# Patient Record
Sex: FEMALE | Race: WHITE | NOT HISPANIC OR LATINO | Employment: PART TIME | URBAN - METROPOLITAN AREA
[De-identification: names, ages, dates, MRNs, and addresses within clinical notes are randomized per-mention and may not be internally consistent; named-entity substitution may affect disease eponyms.]

---

## 2017-01-04 ENCOUNTER — ALLSCRIPTS OFFICE VISIT (OUTPATIENT)
Dept: OTHER | Facility: OTHER | Age: 52
End: 2017-01-04

## 2017-01-04 DIAGNOSIS — M25.569 PAIN IN KNEE: ICD-10-CM

## 2017-01-04 DIAGNOSIS — S83.232D COMPLEX TEAR OF MEDIAL MENISCUS, CURRENT INJURY, LEFT KNEE, SUBSEQUENT ENCOUNTER: ICD-10-CM

## 2017-01-10 ENCOUNTER — ALLSCRIPTS OFFICE VISIT (OUTPATIENT)
Dept: OTHER | Facility: OTHER | Age: 52
End: 2017-01-10

## 2017-01-10 LAB — S PYO AG THROAT QL: POSITIVE

## 2017-04-02 ENCOUNTER — HOSPITAL ENCOUNTER (OUTPATIENT)
Dept: RADIOLOGY | Facility: MEDICAL CENTER | Age: 52
Discharge: HOME | End: 2017-04-02
Attending: PHYSICIAN ASSISTANT | Admitting: FAMILY MEDICINE
Payer: COMMERCIAL

## 2017-04-02 ENCOUNTER — TRANSCRIBE ORDERS (OUTPATIENT)
Dept: URGENT CARE | Facility: MEDICAL CENTER | Age: 52
End: 2017-04-02

## 2017-04-02 ENCOUNTER — OFFICE VISIT (OUTPATIENT)
Dept: URGENT CARE | Facility: MEDICAL CENTER | Age: 52
End: 2017-04-02
Payer: COMMERCIAL

## 2017-04-02 DIAGNOSIS — M79.671 PAIN OF RIGHT FOOT: ICD-10-CM

## 2017-04-02 PROCEDURE — 73630 X-RAY EXAM OF FOOT: CPT

## 2017-04-02 PROCEDURE — 99213 OFFICE O/P EST LOW 20 MIN: CPT

## 2017-05-08 ENCOUNTER — GENERIC CONVERSION - ENCOUNTER (OUTPATIENT)
Dept: OTHER | Facility: OTHER | Age: 52
End: 2017-05-08

## 2017-05-31 ENCOUNTER — ALLSCRIPTS OFFICE VISIT (OUTPATIENT)
Dept: OTHER | Facility: OTHER | Age: 52
End: 2017-05-31

## 2017-05-31 DIAGNOSIS — J01.00 ACUTE MAXILLARY SINUSITIS: ICD-10-CM

## 2017-05-31 DIAGNOSIS — I10 ESSENTIAL (PRIMARY) HYPERTENSION: ICD-10-CM

## 2017-05-31 DIAGNOSIS — E66.01 MORBID (SEVERE) OBESITY DUE TO EXCESS CALORIES (HCC): ICD-10-CM

## 2017-09-05 ENCOUNTER — OFFICE VISIT (OUTPATIENT)
Dept: URGENT CARE | Facility: MEDICAL CENTER | Age: 52
End: 2017-09-05
Payer: COMMERCIAL

## 2017-09-05 ENCOUNTER — APPOINTMENT (OUTPATIENT)
Dept: RADIOLOGY | Facility: MEDICAL CENTER | Age: 52
End: 2017-09-05
Payer: COMMERCIAL

## 2017-09-05 DIAGNOSIS — M25.562 PAIN IN LEFT KNEE: ICD-10-CM

## 2017-09-05 DIAGNOSIS — M25.569 PAIN IN KNEE: ICD-10-CM

## 2017-09-05 PROCEDURE — 99213 OFFICE O/P EST LOW 20 MIN: CPT

## 2017-09-05 PROCEDURE — 73562 X-RAY EXAM OF KNEE 3: CPT

## 2018-01-09 NOTE — PROGRESS NOTES
Assessment    1  Cervical radiculopathy (723 4) (M54 12)   2  Cervical spinal stenosis (723 0) (M48 02)   3  Hydrocephalus (331 4) (G91 9)    Plan    · Follow-up visit in 6 months Evaluation and Treatment  Follow-up  Status: Complete   Done: 16UYP0877   Ordered; For: Cervical radiculopathy, Cervical spinal stenosis, Hydrocephalus; Ordered By: Tricia Butler Performed:  Due: 86SOM5462; Last Updated By: Peyton Evans; 2/4/2016 9:32:11 AM    Discussion/Summary    49-year-old woman with posterior fossa arachnoid cyst, ventriculomegaly with compensated hydrocephalus and cervical spondylosis and stenosis  I reviewed her history, physical examination and imaging in detail with her today  A total of 30 minutes is spent with patient which more than 50% was spent in direct counseling coordination of care  The arachnoid cyst and ventriculomegaly noted on her MRI seems incidental to her presentation  She does not have signs or symptoms of raised intracranial pressure  I did review the signs and symptoms of raised intracranial pressure with her today and asked her to contact my office should any concerns arise  She has no objective findings of myelopathy today  Her right arm pain seems to be radicular nature  She was recently seen by her pain specialist, but felt that her symptoms were not severe enough to merit any changes in her medication or consideration of cervical epidural steroid injection  At present, she is not interested in considering any surgical intervention for cervical stenosis  All her questions were answered to her satisfaction  I reviewed the signs and symptoms of raised intracranial pressure and cervical radiculopathy and myelopathy with her today  She will avoid activities that could result in extreme range of motion of the cervical spine  I will see her again in 6 months time for ongoing clinical surveillance  She will contact my office should concerns arise in the interim   She is in agreement with this course of action  The patient was counseled regarding instructions for management, risk factor reductions, prognosis, patient and family education, impressions, importance of compliance with treatment  total time of encounter was 25 minutes and 20 minutes was spent counseling  Chief Complaint  Right arm pain  History of Present Illness  I last saw this pleasant 49-year-old left-hand-dominant woman in July 2015  She had been referred for cervical stenosis with radiculopathy in addition to an arachnoid cyst with hydrocephalus  Since her last visit she has had no recurrence of her left arm radicular symptoms but currently has 2-8/10 pain radiating from the right paraspinal musculature into her biceps  The pain depends on her activity level and the weather  She feels as if her right hand is somewhat weak  She denies any numbness in the right arm  She has bilateral knee pain but denies any weakness or numbness in her legs  She notices urinary urgency without nikos incontinence  She denies any fecal incontinence  She denies any change in perineal sensation  She denies any headaches, nausea vomiting or change in vision  She denies any swallowing difficulties  She denies any loss of consciousness or seizure activity  She presents today for ongoing clinical follow-up  Review of Systems    Constitutional: No fever, no chills, feels well, no tiredness, no recent weight gain or weight loss  Eyes: wearing eyeglasses, but No complaints of eye pain, no red eyes, no eyesight problems, no discharge, no dry eyes, no itching of eyes  ENT: sore throat, hoarseness and sinus congestion, but no earache, no nosebleeds, no hearing loss and no nasal discharge  Cardiovascular: No complaints of slow heart rate, no fast heart rate, no chest pain, no palpitations, no leg claudication, no lower extremity edema  Respiratory: orthopnea and PND     Gastrointestinal: No complaints of abdominal pain, no constipation, no nausea or vomiting, no diarrhea, no bloody stools  Genitourinary: urgency, but No complaints of dysuria, no incontinence, no pelvic pain, no dysmenorrhea, no vaginal discharge or bleeding  Musculoskeletal: limb pain and right arm pain to elbow cramping in right forearm    The patient presents with complaints of neck no arthralgias, radiating to the bilateral upper arm and bilateral lower arm  Integumentary: No complaints of skin rash or lesions, no itching, no skin wounds, no breast pain or lump  Neurological: limb weakness and forgetfulness / STML, weakness in the right forearm and right hand, but no headache, no numbness, no tingling, no confusion, no convulsions, no fainting and no difficulty walking    The patient presents with complaints of occasional episodes of dizziness  Psychiatric: Not suicidal, no sleep disturbance, no anxiety or depression, no change in personality, no emotional problems  Endocrine: No complaints of proptosis, no hot flashes, no muscle weakness, no deepening of the voice, no feelings of weakness  Hematologic/Lymphatic: 81 mg aspirin regimen - denies any personal or known family history of bleeding/clotting issues, anuerysm, but No complaints of swollen glands, no swollen glands in the neck, does not bleed easily, does not bruise easily  ROS reviewed  Active Problems    1  Acute sinusitis (461 9) (J01 90)   2  Allergic reaction (995 3) (T78 40XA)   3  Anxiety (300 00) (F41 9)   4  Benign essential HTN (401 1) (I10)   5  Cervical arthritis (721 0) (M46 92)   6  Cervical radiculopathy (723 4) (M54 12)   7  Cervical spinal stenosis (723 0) (M48 02)   8  Cervical spondylosis without myelopathy (721 0) (M47 812)   9  Cervicalgia (723 1) (M54 2)   10  Eustachian tube dysfunction (381 81) (H69 80)   11  Hydrocephalus (331 4) (G91 9)   12  Intracranial arachnoid cyst (348 0) (G93 0)   13  Knee pain (719 46) (M25 569)   14  Obesity (278 00) (E66 9)   15   Pain in right foot (729 5) (M79 671)   16  Shoulder tendonitis (726 10) (M75 80)   17  Spider bite (989 5,E905 1) (T63 301A)   18  Subacromial bursitis (726 19) (M75 50)   19  Urticaria (708 9) (L50 9)    Past Medical History    1  History of Acute maxillary sinusitis (461 0) (J01 00)   2  History of Allergic Reaction (995 3)   3  History of Cellulitis, leg (682 6) (L03 119)   4  History of Complete Tear Of The Anterior Cruciate Ligament Of The Knee (844 2)   5  History of Cough (786 2) (R05)   6  History of Elbow tendonitis (727 09) (M77 8)   7  History of Fall from horse (E828 2) (V80 010A)   8  History of acute sinusitis (V12 69) (Z87 09)   9  History of arthritis (V13 4) (Z87 39)   10  History of concussion (V15 52) (Z87 820)   11  History of depression (V11 8) (Z86 59)   12  History of Sore throat (462) (J02 9)   13  History of Tingling (782 0) (R20 2)   14  History of Varicella (052 9) (B01 9)    The active problems and past medical history were reviewed and updated today  Surgical History    1  History of Gallbladder Surgery   2  History of Knee Surgery    The surgical history was reviewed and updated today  Family History    1  Family history of Osteoporosis (V17 81)    2  Family history of Bone Cancer   3  Family history of Colitis   4  Family history of    5  Family history of Hiatal Hernia    6  Family history of     7  Family history of     6  Family history of     5  Family history of     8  Family history of Back problem   11  Family history of Benign Essential Hypertension   12  Family history of Hyperlipidemia   15  Family history of Hypertension, benign    The family history was reviewed and updated today         Social History    · Being A Social Drinker   · Completed college   · Daily Coffee Consumption (___ Cups/Day)   · Housewife or homemaker   ·    · Never A Smoker   · No drug use   · Uses Safety Equipment - Seatbelts  The social history was reviewed and updated today  Current Meds   1  ALPRAZolam 0 25 MG Oral Tablet; take 1 tablet every day; Therapy: 27LKZ4562 to (Gabriel Preston)  Requested for: 63TUK5115; Last   Rx:61Tag6237 Ordered   2  Aspirin 81 MG CAPS; qd;   Therapy: (Recorded:03Lgd1418) to Recorded   3  Gabapentin 300 MG Oral Capsule; TAKE 1 CAPSULE 3 TIMES DAILY; Therapy: 31CDB1161 to (Evaluate:20Jun2016)  Requested for: 11Sxm4532; Last   Rx:90Ipf6695 Ordered   4  Lisinopril 5 MG Oral Tablet; TAKE 1 TABLET DAILY AS DIRECTED; Therapy: 76LRD9723 to 0699 273 53 89)  Requested for: 34OAC7334; Last   Rx:36Tnv7864 Ordered    The medication list was reviewed and updated today  Allergies    1  Bactrim TABS   2  Clindamycin HCl CAPS   3  Keflex TABS    Vitals  Vital Signs [Data Includes: Current Encounter]    Recorded: 21XYU7957 08:55AM   Temperature 98 2 F, Oral   Heart Rate 90   Respiration 18   Systolic 877, LUE, Sitting   Diastolic 80, LUE, Sitting   Height 5 ft 6 in   Weight 252 lb 4 oz   BMI Calculated 40 71   BSA Calculated 2 21   Pain Scale 7     Physical Exam     Constitutional Patient appears the stated age  No signs of acute distress present  No involuntary movement  Patient is cooperative  Eyes Discs flat with sharp margins  Bilateral optic discs: not visualized  Right optic disc: not visualized  Left optic disc: no papilledema  Musculo: Spine Cervical Spine: Normal    Skin warm and dry  No rashes, lesions or ecchymosis  Neurologic - Mental Status: Alert and Oriented x3  Mood and affect: Affect is normal   Memory is intact  Cranial Nerve Exam:  3rd, 4th, and 6th cranial nerves: PERRLA, EOMI without lateral gaze nystagmus  5th cranial nerve: Normal with no noted deficit  7th cranial nerve: Face symmetrical at grimace and at rest  11th cranial nerve: Shoulder shrug equal bilaterally  12th cranial nerve: Tongue mideline, no atrophy present  Motor System General Motor Strength: No pronator drift and no parietal drift  Motor Strength:  5/5  and finger span bilaterally  Strength examination:   Wrist extension was 5/5 on the right side and 5/5 on the left side  Biceps strength was 5/5 on the right side and 5/5 on the left side  Triceps strength was 5/5 on the right side and 5/5 on the left side  Shoulder abduction was 5/5 on the right side and 5/5 on the left side  Foot Plantar Flexion: 5/5 on the right side and 5/5 on the left side  Foot Dorsiflexion: 5/5 on the right side and 5/5 on the left side  Great toe IP extension: 5/5 on the right side and 5/5 on the left side  Knee Flexion: 5/5 on the right side and 5/5 on the left side  Knee Extension: 5/5 on the right side and 5/5 on the left side  Hip Flexion: 5/5 on the right side and 5/5 on the left side  Motor System - Upper Extremities: Muscle tone: Normal bilaterally  Muscle Bulk: Normal bilaterally  Motor System - Lower Extremities: Muscle tone: Normal bilaterally  Muscle Bulk: Normal bilaterally  Reflexes: Biceps reflexes are intact bilaterally  Brachioradialis reflexes are intact bilaterally  Achilles reflexes are 2+ bilaterally  Babinski's reflex is down going bilaterally  Garcia's sign is absent bilaterally  Deep tendon reflexes: 1+ right biceps, 1+ left biceps, 1+ right triceps and 1+ left tricepsno ankle clonus on the right and no ankle clonus on the left  Superficial/Primitive Reflexes: Babinski reflex absent on the right and Babinski reflex absent on the left  Garcia sign was not present:   Coordination: Finger to nose was normal   Coordination: normal balance, negative Romberg's sign and no dysdiadochokinesia  Sensory: Sensation grossly intact to light touch  Sensory exam: intact to light touch, intact pain and temperature sensation and intact vibration sensation  Gait and Station: Able to heal toe gait and Romberg negative         Future Appointments    Date/Time Provider Specialty Site   03/29/2016 09:20 AM Raza [facility] Obstetrics/Gynecology [facility]   06/14/2016 08:00 AM Merlin Blackbird, CRNP Pain Management  [address] [facility]     Signatures   Electronically signed by : NORMA Lemos ; Feb 4 2016  9:38AM EST                       (Author)

## 2018-01-10 NOTE — RESULT NOTES
Message   Recorded as Task   Date: 08/04/2016 04:21 PM, Created By: Bianca Larsen   Task Name: Follow Up   Assigned To: [facility],Team   Regarding Patient: Venkatesh Angeles, Status: Active   CommentThaddeus Grounds - 04 Aug 2016 4:21 PM     TASK CREATED  Left message for patient to call back about L-spine x-ray results  if she calls back let her know there was no fracutures seen  mild Degen disc disease and arthritis at L1-L2  Patient should take tizanidine, which was prescribed at ov today  We can re-eval back pain after JANNY   Sindi Cope - 05 Aug 2016 8:30 AM     TASK IN PROGRESS   Sindi Cope - 10 Aug 2016 7:30 AM     TASK EDITED   Venu Chaudhry - 10 Aug 2016 9:04 AM     TASK EDITED  Contacted pt today and advised of the same  Pt verbalized understanding of xray results and to take the tizanidine as prescribed at last ov  Sindi Cope - 10 Aug 2016 9:04 AM     TASK REASSIGNED: Previously Assigned To ABIGAIL Groves Copper - 11 Aug 2016 7:38 AM     TASK REPLIED TO: Previously Assigned To Deyanira Donnelly  thanks        Signatures   Electronically signed by :  Ermelinda Joyce, ; Aug 11 2016  8:54AM EST                       (Author)

## 2018-01-11 NOTE — PROGRESS NOTES
Assessment   1  Left knee pain (710 40) (M26 562)    Plan   Knee pain    · * XR KNEE 3 VW LEFT NON INJURY; Status:Canceled; Left knee pain    · * XR KNEE 4+ VW LEFT INJURY; Status:Active; Requested DYD:[id number];     Discussion/Summary   Discussion Summary:    Rice measures as directed  Take Motrin as directed for pain  Follow-up with Orthopedics  Medication Side Effects Reviewed: Possible side effects of new medications were reviewed with the patient/guardian today  Understands and agrees with treatment plan: The treatment plan was reviewed with the patient/guardian  The patient/guardian understands and agrees with the treatment plan    Follow Up Instructions: Follow Up with your Primary Care Provider in 1-2 days  If your symptoms worsen, go to the nearest [place] [address] Emergency Department  Chief Complaint   Chief Complaint Free Text Note Form: Left knee pain [id number]  Past injury 2 year ago  History of Present Illness   HPI: Pt reports she injured it 2 years ago while trying to get out of the mud  Pt reports pain has been worse x 3 weeks  Pt reports knee is giving out  Pt denies any OTC medications  Pain made worse with weight bearing, extension  Hospital Based Practices Required Assessment:      Pain Assessment      the patient states they have pain  The pain is located in the RT knee  The patient describes the pain as sharp  (on a scale of 0 to 10, the patient rates the pain at 7 )      Abuse And Domestic Violence Screen       Yes, the patient is safe at home  -- The patient states no one is hurting them  Depression And Suicide Screen  No, the patient has not had thoughts of hurting themself  No, the patient has not felt depressed in the past 7 days  Prefered Language is  english  Primary Language is  english  Readiness To Learn: Receptive  Barriers To Learning: none  Preferred Learning: verbal      Active Problems   1   Acute maxillary sinusitis, recurrence not specified (461 0) (J01 00)   2  Anxiety (300 00) (F41 9)   3  Benign essential HTN (401 1) (I10)   4  Cervical radiculopathy (723 4) (M54 12)   5  Cervical spondylosis with radiculopathy (721 0) (M47 22)   6  Cervical spondylosis without myelopathy (721 0) (M47 812)   7  Cervicalgia (723 1) (M54 2)   8  Complex tear of medial meniscus, current injury, left knee, subsequent encounter     (V58 89) (S83 232D)   9  Hydrocephalus (331 4) (G91 9)   10  Intracranial arachnoid cyst (348 0) (G93 0)   11  Morbid obesity (278 01) (E66 01)   12  Patellofemoral arthritis of left knee (716 96) (M17 12)   13  Screen for colon cancer (V76 51) (Z12 11)   14  Screening for breast cancer (V76 10) (Z12 31)    Past Medical History   1  History of Acute maxillary sinusitis (461 0) (J01 00)   2  Acute pharyngitis (462) (J02 9)   3  History of Allergic Reaction (995 3)   4  History of Cellulitis, leg (682 6) (L03 119)   5  History of Complete Tear Of The Anterior Cruciate Ligament Of The Knee (844 2)   6  History of Cough (786 2) (R05)   7  History of Denial of substance abuse   8  History of Elbow tendonitis (727 09) (M77 8)   9  History of Fall from horse (E828 2) (V80 010A)   10  History of acute sinusitis (V12 69) (Z87 09)   11  History of acute sinusitis (V12 69) (Z87 09)   12  History of arthritis (V13 4) (Z87 39)   13  History of concussion (V15 52) (Z87 820)   14  History of depression (V11 8) (Z86 59)   15  History of Sore throat (462) (J02 9)   16  History of Tingling (782 0) (R20 2)   17  History of Varicella (052 9) (B01 9)  Active Problems And Past Medical History Reviewed: The active problems and past medical history were reviewed and updated today  Family History   Mother    1  Family history of Osteoporosis (V17 81)  Father    2  Family history of Bone Cancer   3  Family history of Colitis   4  Family history of    5  Family history of Hiatal Hernia  Maternal Grandmother    6   Family history of   Paternal Grandmother    9  Family history of   Maternal Grandfather    8  Family history of   Paternal Grandfather    5  Family history of   Family History    10  Family history of Back problem   11  Family history of Benign Essential Hypertension   12  Denied: Family history of Denial of substance abuse   13  Family history of Hyperlipidemia   15  Family history of Hypertension, benign   15  Denied: No family history of mental disorder  Family History Reviewed: The family history was reviewed and updated today  Social History    · Being A Social Drinker   · Completed college   · Daily Coffee Consumption (___ Cups/Day)   · Housewife or homemaker   ·    · Never A Smoker   · No drug use   · Uses Safety Equipment - Seatbelts  Social History Reviewed: The social history was reviewed and updated today  Surgical History   1  History of Gallbladder Surgery   2  History of Knee Surgery  Surgical History Reviewed: The surgical history was reviewed and updated today  Current Meds    1  ALPRAZolam 0 25 MG Oral Tablet; take 1 tablet by mouth every day; Therapy: 96IDH5755 to (Evaluate:05Big0440)  Requested for: 93YEN6292; Last     MX:74VLL7277 Ordered   2  Amoxicillin 500 MG Oral Tablet; TAKE 1 TABLET 3 TIMES DAILY UNTIL GONE;     Therapy: 53GHP3155 to (Evaluate:2017)  Requested for: 98BAO7439; Last     Rx:04Mqc6279 Ordered   3  Aspirin 81 MG CAPS; qd;     Therapy: (Recorded:08Lwm9987) to Recorded   4  BD Pen Needle Mona U/F 32G X 4 MM Miscellaneous; INJECT SAXENDA DAILY AS     DIRECTED; Therapy: 37EIF1324 to (Evaluate:87Vgi4363)  Requested for: 21Zfa7084; Last     Rx:30Djl1106 Ordered   5  DULoxetine HCl - 30 MG Oral Capsule Delayed Release Particles; TAKE ONE CAPSULE     BY MOUTH EVERY DAY; Therapy: 87UHU3471 to (Evaluate:2017)  Requested for: 58JFV6019; Last     Rx:37Odc9820 Ordered   6   Lisinopril 5 MG Oral Tablet; TAKE 1 TABLET DAILY AS DIRECTED; Therapy: 29JCF3247 to (Evaluate:93Zat1383)  Requested for: 91XOO8572; Last     Rx:73Rtp8055 Ordered   7  Saxenda 18 MG/3ML Subcutaneous Solution Pen-injector; INJECT 3 MG Daily; Therapy: 08JJI0294 to (Last Rx:19Jan2017)  Requested for: 31YJZ9377 Ordered  Medication List Reviewed: The medication list was reviewed and updated today  Allergies   1  Bactrim TABS   2  Clindamycin HCl CAPS   3  Keflex TABS    Vitals   Signs   Recorded: 05Sep2017 04:20PM   Temperature: 97 F  Heart Rate: 86  Respiration: 16  Systolic: 939  Diastolic: 77  Height: 5 ft 6 in  Weight: 231 lb   BMI Calculated: 37 28  BSA Calculated: 2 13  O2 Saturation: 97  Pain Scale: 7    Physical Exam        Constitutional      General appearance: No acute distress, well appearing and well nourished  Musculoskeletal      Gait and station: Abnormal   Gait evaluation demonstrated antalgia on the left  Inspection/palpation of joints, bones, and muscles: Abnormal        Neurologic      Cranial nerves: Cranial nerves 2-12 intact         Psychiatric      Orientation to person, place, and time: Normal        Mood and affect: Normal        Signatures    Electronically signed by : Debra Kohli, PAC; Guerrero 10 2018  7:22AM EST                       (Author)     Electronically signed by : FABIENNE Roth O ; Guerrero 10 2018  9:59AM EST                       (Co-author)

## 2018-01-13 VITALS
TEMPERATURE: 97.6 F | WEIGHT: 235 LBS | RESPIRATION RATE: 18 BRPM | SYSTOLIC BLOOD PRESSURE: 124 MMHG | BODY MASS INDEX: 37.77 KG/M2 | DIASTOLIC BLOOD PRESSURE: 86 MMHG | HEIGHT: 66 IN | HEART RATE: 90 BPM

## 2018-01-13 NOTE — MISCELLANEOUS
Message  checked pdmp site and okay      Plan  Anxiety    · ALPRAZolam 0 25 MG Oral Tablet (Xanax); take 1 tablet by mouth every day    Signatures   Electronically signed by : Joselyn Mina DO; May  8 2017  7:42AM EST                       (Author)

## 2018-01-14 VITALS
WEIGHT: 231 LBS | BODY MASS INDEX: 37.12 KG/M2 | OXYGEN SATURATION: 98 % | RESPIRATION RATE: 14 BRPM | SYSTOLIC BLOOD PRESSURE: 124 MMHG | TEMPERATURE: 97.6 F | HEART RATE: 78 BPM | HEIGHT: 66 IN | DIASTOLIC BLOOD PRESSURE: 84 MMHG

## 2018-01-14 VITALS
DIASTOLIC BLOOD PRESSURE: 92 MMHG | HEART RATE: 90 BPM | BODY MASS INDEX: 38.27 KG/M2 | RESPIRATION RATE: 20 BRPM | WEIGHT: 237.13 LBS | SYSTOLIC BLOOD PRESSURE: 144 MMHG

## 2018-01-16 NOTE — MISCELLANEOUS
Message   Recorded as Task   Date: 08/30/2016 08:24 AM, Created By: Scott Rangel   Task Name: Follow Up   Assigned To: [facility] procedure,Team   Regarding Patient: Fredy Britt, Status: Active   CommentPaulalino Vinicius - 30 Aug 2016 8:24 AM     TASK CREATED  Pt is S/p JANNY on 8/23/16 by Dr Amanda Srinivasan  No f/u scheduled   Rochelle Holloway - 30 Aug 2016 10:37 AM     TASK EDITED  1st attempt to reach pt  LM for return call  Rochelle Holloway - 30 Aug 2016 11:37 AM     TASK EDITED  Spoke with pt who received 85% relief from inj  Current level of shilpa n 1/10, prior to inj 8-9/10  Pt will f/u PRN  Patrick Vazquez - 30 Aug 2016 12:36 PM     TASK REPLIED TO: Previously Assigned To Patrick Vazuqez md aware        Active Problems    1  Anxiety (300 00) (F41 9)   2  Benign essential HTN (401 1) (I10)   3  Cervical arthritis (721 0) (M46 92)   4  Cervical radiculopathy (723 4) (M54 12)   5  Cervical spinal stenosis (723 0) (M48 02)   6  Cervical spondylosis with radiculopathy (721 0) (M47 22)   7  Cervical spondylosis without myelopathy (721 0) (M47 812)   8  Cervicalgia (723 1) (M54 2)   9  Hydrocephalus (331 4) (G91 9)   10  Intracranial arachnoid cyst (348 0) (G93 0)   11  Knee pain (719 46) (M25 569)   12  Low back pain (724 2) (M54 5)   13  Morbid obesity (278 01) (E66 01)   14  Myofascial pain (729 1) (M79 1)   15  Patellofemoral arthritis of left knee (716 96) (M19 90)   16  Right ankle pain (719 47) (M25 571)   17  Right foot pain (729 5) (M79 671)   18  Screen for colon cancer (V76 51) (Z12 11)   19  Screening for breast cancer (V76 10) (Z12 39)   20  Shoulder tendonitis (726 10) (M75 80)   21  Stress fracture of metatarsal bone, right, initial encounter (733 94) (M84 374A)   22  Subacromial bursitis (726 19) (M75 50)   23  Tear of medial meniscus of left knee, subsequent encounter (V58 89,836 0) (D12 140R)    Current Meds   1  ALPRAZolam 0 25 MG Oral Tablet (Xanax); take 1 tablet every day;    Therapy: 37RNT7487 to (Evaluate:06Sep2016)  Requested for: 51YLK5489; Last   Rx:23Foq6349 Ordered   2  Aspirin 81 MG CAPS; qd;   Therapy: (Recorded:49Xqg1046) to Recorded   3  BD Pen Needle Mona U/F 32G X 4 MM Miscellaneous; inject saxenda daily as  directed; Therapy: 67SIN3942 to (Last Rx:13Doh0287)  Requested for: 57Zhi5588 Ordered   4  Gabapentin 300 MG Oral Capsule; TAKE 1 CAPSULE 3 TIMES DAILY; Therapy: 50MXH4173 to (Evaluate:31Jan2017)  Requested for: 25Lhl1393; Last   Rx:73Tyt9053 Ordered   5  Lisinopril 5 MG Oral Tablet; TAKE 1 TABLET DAILY AS DIRECTED; Therapy: 26OAK4606 to (Evaluate:03Sep2016)  Requested for: 08Twy7085; Last   Rx:40Aki8663 Ordered   6  Saxenda 18 MG/3ML Subcutaneous Solution Pen-injector; titrate  weekly  as  directed   then inject 3  mg  daily; Therapy: 40JAL2049 to (Last Rx:88Gsd3328)  Requested for: 20Elp0494 Ordered   7  TiZANidine HCl - 4 MG Oral Tablet; TAKE 1 TABLET AT BEDTIME; Therapy: 83LJN2163 to (566 324 313)  Requested for: 21Egl9141; Last   Rx:25Cbn7357 Ordered    Allergies    1  Bactrim TABS   2  Clindamycin HCl CAPS   3   Keflex TABS    Signatures   Electronically signed by : Nani Garcia, ; Aug 30 2016  1:02PM EST                       (Author)

## 2018-01-16 NOTE — RESULT NOTES
Message   Recorded as Task   Date: 08/22/2016 02:20 PM, Created By: Mariya Armstrong   Task Name: Follow Up   Assigned To: Mariya Armstrong   Regarding Patient: Michel Ramírez, Status: Active   Comment:    Mariya Armstrong - 22 Aug 2016 2:20 PM     TASK CREATED  S/W patient - patient stated only taking ASA as a preventative on her own - was never advised by a doctor or prescribed by a doctor to start ASA - patient stated has actually been off the ASA for 7 days now - advised patient ok to have procedure for tomorrow, Tuesday, August 23        Signatures   Electronically signed by : Ricardo Murillo, ; Aug 22 2016  2:20PM EST                       (Author)

## 2018-01-17 NOTE — RESULT NOTES
Verified Results  * MAMMO SCREENING BILATERAL W CAD [id number] 10:26AM Emi Burnham Order Number: [id number]    Order Number: [id number]     Test Name Result Flag Reference   MAMMO SCREENING BILATERAL W CAD (Report)     Patient History:   Family history of colorectal cancer in maternal grandmother at    age 67 and breast cancer in sister at age 62  Patient has never smoked  Patient's BMI is 39 5  Reason for exam: screening (asymptomatic)  Mammo Screening Bilateral W CAD: September 22, 2016 - Check In #:   [de-identified]   Bilateral CC and MLO view(s) were taken  Technologist: ANUPAMA Salinas (R)(M)   Prior study comparison: March 30, 2015, bilateral [facility] dig    scrn mammo w/CAD performed at [address]  March 24, 2014, bilateral [facility] dig scrn mammo w/CAD    performed at [address]  The breast tissue is heterogeneously dense, potentially limiting    the sensitivity of mammography  Therefore, automated breast    ultrasound or MRI may be beneficial  Patient risk, included in    this report, assists in determining the appropriate adjunct    screening exam  3-D mammography may also be indicated as next    year's screening mammogram (based again on the above factors)  No dominant soft tissue mass, architectural distortion or    suspicious calcifications are noted in either breast   The skin    and nipple contours are within normal limits  No evidence of malignancy  No significant changes when compared with prior studies  ASSESSMENT: BiRad:1 - Negative     Recommendation:   Routine screening mammogram of both breasts in 1 year  A    reminder letter will be scheduled  Analyzed by CAD     8-10% of cancers will be missed on mammography  Management of a    palpable abnormality must be based on clinical grounds  Patients   will be notified of their results via letter from our facility     Accredited by [employer] of [employer] and FDA       Transcription Location: ANUPAMA Chand 98: [id number]     Risk Value(s):   Tyrer-Cuzick 10 Year: 6 831%, Tyrer-Cuzick Lifetime: 25 999%,    Myriad Table: 1 5%, MAURILIO 5 Year: 2 0%, NCI Lifetime: 16 6%   Signed by:   Marga Soulier, MD   9/22/16

## 2018-01-18 NOTE — PROGRESS NOTES
Assessment    1  Acute upper respiratory infection (465 9) (J06 9)    Discussion/Summary  Discussion Summary:   1  Delsym 1-2 Tsp  every 12 hours as needed for cough  2  Push fluids  3  Nasal saline washes as needed for congestion  Medication Side Effects Reviewed: Possible side effects of new medications were reviewed with the patient/guardian today  Understands and agrees with treatment plan: The treatment plan was reviewed with the patient/guardian  The patient/guardian understands and agrees with the treatment plan      Chief Complaint    1  Cough   2  Sore Throat  Chief Complaint Free Text Note Form: Pt c/o cough, PND and sore throat times two days      History of Present Illness  HPI: Patient is a 19-year-old female presents with a two-day history of nasal congestion, postnasal drip and slight sore throat  She denies any fever, chills or body aches  There's been no associated vomiting or diarrhea  Hospital Based Practices Required Assessment:   Pain Assessment   the patient states they have pain  The pain is located in the throat  (on a scale of 0 to 10, the patient rates the pain at 6 )   Abuse And Domestic Violence Screen    Yes, the patient is safe at home  The patient states no one is hurting them  Depression And Suicide Screen  No, the patient has not had thoughts of hurting themself  No, the patient has not felt depressed in the past 7 days  Prefered Language is  english  Primary Language is  english  Readiness To Learn: Receptive  Barriers To Learning: none  Preferred Learning: verbal      Review of Systems  Focused-Female:   Constitutional: No fever, no chills, feels well, no tiredness, no recent weight gain or loss  ENT: sore throat and nasal discharge, but no earache  Respiratory: cough and PND  Active Problems    1  Allergic reaction (995 3) (T78 40XA)   2  Anxiety (300 00) (F41 9)   3  Benign essential HTN (401 1) (I10)   4  Cervical arthritis (721 0) (M46 92)   5  Cervical radiculopathy (723 4) (M54 12)   6  Cervical spinal stenosis (723 0) (M48 02)   7  Cervical spondylosis without myelopathy (721 0) (M47 812)   8  Cervicalgia (723 1) (M54 2)   9  Eustachian tube dysfunction (381 81) (H69 80)   10  Hydrocephalus (331 4) (G91 9)   11  Intracranial arachnoid cyst (348 0) (G93 0)   12  Knee pain (719 46) (M25 569)   13  Obesity (278 00) (E66 9)   14  Pain in right foot (729 5) (M79 671)   15  Shoulder tendonitis (726 10) (M75 80)   16  Spider bite (989 5,E905 1) (T63 301A)   17  Subacromial bursitis (726 19) (M75 50)   18  Urticaria (708 9) (L50 9)    Past Medical History    1  History of Acute maxillary sinusitis (461 0) (J01 00)   2  History of Allergic Reaction (995 3)   3  History of Cellulitis, leg (682 6) (L03 119)   4  History of Complete Tear Of The Anterior Cruciate Ligament Of The Knee (844 2)   5  History of Cough (786 2) (R05)   6  History of Elbow tendonitis (727 09) (M77 8)   7  History of Fall from horse (E828 2) (V80 010A)   8  History of acute sinusitis (V12 69) (Z87 09)   9  History of acute sinusitis (V12 69) (Z87 09)   10  History of arthritis (V13 4) (Z87 39)   11  History of concussion (V15 52) (Z87 820)   12  History of depression (V11 8) (Z86 59)   13  History of Sore throat (462) (J02 9)   14  History of Tingling (782 0) (R20 2)   15  History of Varicella (052 9) (B01 9)  Active Problems And Past Medical History Reviewed: The active problems and past medical history were reviewed and updated today  Family History    1  Family history of Osteoporosis (V17 81)    2  Family history of Bone Cancer   3  Family history of Colitis   4  Family history of    5  Family history of Hiatal Hernia    6  Family history of     7  Family history of     6  Family history of     5  Family history of     8  Family history of Back problem   11  Family history of Benign Essential Hypertension   12   Family history of Hyperlipidemia   13  Family history of Hypertension, benign  Family History Reviewed: The family history was reviewed and updated today  Social History    · Being A Social Drinker   · Completed college   · Daily Coffee Consumption (___ Cups/Day)   · Housewife or homemaker   ·    · Never A Smoker   · No drug use   · Uses Safety Equipment - Seatbelts  Social History Reviewed: The social history was reviewed and updated today  Surgical History    1  History of Gallbladder Surgery   2  History of Knee Surgery  Surgical History Reviewed: The surgical history was reviewed and updated today  Current Meds   1  ALPRAZolam 0 25 MG Oral Tablet; take 1 tablet every day; Therapy: 87ORC9888 to (Nam Nolen)  Requested for: 34VPE9287; Last   Rx:[id number] Ordered   2  Aspirin 81 MG CAPS; qd;   Therapy: (Recorded:12Vqr1789) to Recorded   3  Gabapentin 300 MG Oral Capsule; TAKE 1 CAPSULE 3 TIMES DAILY; Therapy: 92HAZ4308 to (Evaluate:20Jun2016)  Requested for: 40Pak0997; Last   Rx:[id number] Ordered   4  Lisinopril 5 MG Oral Tablet; TAKE 1 TABLET DAILY AS DIRECTED; Therapy: 51JHT2704 to [phone])  Requested for: 69HCA3886; Last   Rx:[id number] Ordered  Medication List Reviewed: The medication list was reviewed and updated today  Allergies    1  Bactrim TABS   2  Clindamycin HCl CAPS   3  Keflex TABS    Vitals  Signs [Data Includes: Current Encounter]   Recorded: 52HDT5112 10:43AM   Temperature: 97 8 F  Heart Rate: 80  Respiration: 18  Systolic: 620  Diastolic: 80  Weight: 934 lb   BMI Calculated: 40 67  BSA Calculated: 2 21  O2 Saturation: 100    Physical Exam    Constitutional   General appearance: No acute distress, well appearing and well nourished  Ears, Nose, Mouth, and Throat   External inspection of ears and nose: Normal     Otoscopic examination: Tympanic membranes translucent with normal light reflex  Canals patent without erythema      Nasal mucosa, septum, and turbinates: Abnormal   Thin, clear nasal drainage bilateral    Oropharynx: Normal with no erythema, edema, exudate or lesions  Pulmonary   Respiratory effort: No increased work of breathing or signs of respiratory distress  Auscultation of lungs: Clear to auscultation  Cardiovascular   Auscultation of heart: Normal rate and rhythm, normal S1 and S2, without murmurs  Future Appointments    Date/Time Provider Specialty Site   03/29/2016 09:20 AM Negrito Huerta [facility] Obstetrics/Gynecology [facility]   06/14/2016 08:00 AM RASHAD [facility]   08/03/2016 09:00 AM NORMA Torre   Neurosurgery [facility]     Signatures   Electronically signed by : Raleigh Corado [facility]; Feb 4 2016 10:56AM EST                       (Author)    Electronically signed by : FABIENNE Torres ; Feb 5 2016  8:52AM EST                       (Co-author)

## 2018-01-25 DIAGNOSIS — I10 ESSENTIAL HYPERTENSION: Primary | ICD-10-CM

## 2018-01-25 RX ORDER — LISINOPRIL 5 MG/1
TABLET ORAL
Qty: 90 TABLET | Refills: 0 | Status: SHIPPED | OUTPATIENT
Start: 2018-01-25 | End: 2018-03-28 | Stop reason: SDUPTHER

## 2018-03-04 DIAGNOSIS — F41.9 ANXIETY DISORDER: Primary | ICD-10-CM

## 2018-03-05 RX ORDER — DULOXETIN HYDROCHLORIDE 30 MG/1
CAPSULE, DELAYED RELEASE ORAL
Qty: 30 CAPSULE | Refills: 0 | Status: SHIPPED | OUTPATIENT
Start: 2018-03-05 | End: 2018-04-23 | Stop reason: SDUPTHER

## 2018-03-24 DIAGNOSIS — I10 ESSENTIAL HYPERTENSION: ICD-10-CM

## 2018-03-26 RX ORDER — LISINOPRIL 5 MG/1
TABLET ORAL
Qty: 90 TABLET | Refills: 0 | OUTPATIENT
Start: 2018-03-26

## 2018-03-28 ENCOUNTER — OFFICE VISIT (OUTPATIENT)
Dept: FAMILY MEDICINE CLINIC | Facility: CLINIC | Age: 53
End: 2018-03-28
Payer: COMMERCIAL

## 2018-03-28 VITALS
BODY MASS INDEX: 36.67 KG/M2 | OXYGEN SATURATION: 98 % | TEMPERATURE: 97.8 F | WEIGHT: 228.2 LBS | SYSTOLIC BLOOD PRESSURE: 140 MMHG | HEIGHT: 66 IN | DIASTOLIC BLOOD PRESSURE: 86 MMHG | HEART RATE: 67 BPM

## 2018-03-28 DIAGNOSIS — I10 ESSENTIAL HYPERTENSION: ICD-10-CM

## 2018-03-28 DIAGNOSIS — E66.01 MORBID OBESITY (HCC): ICD-10-CM

## 2018-03-28 DIAGNOSIS — I10 BENIGN ESSENTIAL HTN: Primary | ICD-10-CM

## 2018-03-28 DIAGNOSIS — Z12.39 ENCOUNTER FOR SCREENING BREAST EXAMINATION: ICD-10-CM

## 2018-03-28 DIAGNOSIS — F41.9 ANXIETY: ICD-10-CM

## 2018-03-28 PROCEDURE — 99214 OFFICE O/P EST MOD 30 MIN: CPT | Performed by: PHYSICIAN ASSISTANT

## 2018-03-28 RX ORDER — LIRAGLUTIDE 6 MG/ML
INJECTION, SOLUTION SUBCUTANEOUS
COMMUNITY
Start: 2018-03-24 | End: 2018-07-12 | Stop reason: SDUPTHER

## 2018-03-28 RX ORDER — ALPRAZOLAM 0.25 MG/1
1 TABLET ORAL DAILY
COMMUNITY
Start: 2015-01-28 | End: 2021-10-14

## 2018-03-28 RX ORDER — LISINOPRIL 5 MG/1
5 TABLET ORAL DAILY
Qty: 90 TABLET | Refills: 0 | Status: SHIPPED | OUTPATIENT
Start: 2018-03-28 | End: 2018-07-29 | Stop reason: SDUPTHER

## 2018-03-28 NOTE — PROGRESS NOTES
Assessment/Plan:         Diagnoses and all orders for this visit:    Benign essential HTN  Comments:    Hypertension stable patient to continue lisinopril 5 mg daily  Anxiety  Comments:    Anxiety stable with p r n  alprazolam and 2 Loxitane 30 mg  Morbid obesity ([facility] [address] )  Comments:   patient doing well on saxenda  continue low-fat low carb diet exercise  Encounter for screening breast examination  Comments:    Mammogram ordered  Essential hypertension    Other orders  -     SAXENDA 18 MG/3ML SOPN;   -     ALPRAZolam (XANAX) 0 25 mg tablet; Take 1 tablet by mouth daily  -     aspirin 81 MG tablet; Take by mouth daily  -     Insulin Pen Needle (BD PEN NEEDLE KAMILLA U/F) 32G X 4 MM MISC; by Does not apply route          Subjective:      Patient ID: Nanette Melo is a 46 y o  female  Patient presents for follow up chronic conditions  Patient has anxiety for which she takes p r n  Alprazolam   Patient is also on duloxetine 30 mg for anxiety and for some arthritis pain  Patient states this helps her  Patient has hypertension on lisinopril 5 mg  Patient has lost  25 lb with the use of sex and a  Patient states she went off of it for while but she is going back on and she wants to continue to lose weight to help her arthritic knees and back Excedrin  Patient has no side effects with sex tendon tolerating product very well  Patient is walking for exercise  The following portions of the patient's history were reviewed and updated as appropriate:   She  has no past medical history on file    She   Patient Active Problem List    Diagnosis Date Noted    Encounter for screening breast examination 03/28/2018    Morbid obesity ([facility] [address] ) 07/12/2016    Patellofemoral arthritis of left knee 04/13/2016    Intracranial arachnoid cyst 07/21/2015    Cervical spondylosis without myelopathy 07/21/2015    Hydrocephalus 06/25/2015    Benign essential HTN 02/25/2015    Anxiety 08/08/2012     Current Outpatient Prescriptions   Medication Sig Dispense Refill    ALPRAZolam (XANAX) 0 25 mg tablet Take 1 tablet by mouth daily      Insulin Pen Needle (BD PEN NEEDLE KAMILLA U/F) 32G X 4 MM MISC by Does not apply route      aspirin 81 MG tablet Take by mouth daily      DULoxetine (CYMBALTA) 30 mg delayed release capsule TAKE ONE CAPSULE BY MOUTH EVERY DAY 30 capsule 0    lisinopril (ZESTRIL) 5 mg tablet TAKE 1 TABLET DAILY AS DIRECTED  90 tablet 0    SAXENDA 18 MG/3ML SOPN        No current facility-administered medications for this visit  Current Outpatient Prescriptions on File Prior to Visit   Medication Sig    DULoxetine (CYMBALTA) 30 mg delayed release capsule TAKE ONE CAPSULE BY MOUTH EVERY DAY    lisinopril (ZESTRIL) 5 mg tablet TAKE 1 TABLET DAILY AS DIRECTED  No current facility-administered medications on file prior to visit  She is allergic to cephalexin; clindamycin; and sulfamethoxazole-trimethoprim       Review of Systems   Constitutional: Negative for unexpected weight change  HENT: Negative for sore throat  Respiratory: Negative for shortness of breath  Cardiovascular: Negative for chest pain and palpitations  Gastrointestinal: Negative for abdominal pain, constipation, diarrhea and nausea  Genitourinary: Negative for dysuria  Musculoskeletal: Positive for back pain  Skin: Negative for rash  Neurological: Negative for dizziness and light-headedness  Psychiatric/Behavioral: Negative for behavioral problems  The patient is not nervous/anxious  Objective:      /86 (BP Location: Left arm, Patient Position: Sitting, Cuff Size: Standard)   Pulse 67   Temp 97 8 °F (36 6 °C)   Ht 5' 6" (1 676 m)   Wt 104 kg (228 lb 3 2 oz)   SpO2 98%   BMI 36 83 kg/m²          Physical Exam   Constitutional: She is oriented to person, place, and time  She appears well-developed and well-nourished  Obese  White  female   HENT:   Head: Normocephalic     Right Ear: External ear normal    Left Ear: External ear normal    Mouth/Throat: Oropharynx is clear and moist    Eyes: Conjunctivae are normal  Pupils are equal, round, and reactive to light  Neck: Neck supple  No thyromegaly present  Cardiovascular: Normal rate, regular rhythm, normal heart sounds and intact distal pulses  No murmur heard  Pulmonary/Chest: Effort normal and breath sounds normal    Abdominal: She exhibits no mass  There is no tenderness  Musculoskeletal: She exhibits no edema  Lymphadenopathy:     She has no cervical adenopathy  Neurological: She is alert and oriented to person, place, and time  Skin: Skin is warm and dry  Psychiatric: She has a normal mood and affect   Her behavior is normal  Judgment and thought content normal

## 2018-04-23 DIAGNOSIS — F41.9 ANXIETY DISORDER: ICD-10-CM

## 2018-04-23 RX ORDER — DULOXETIN HYDROCHLORIDE 30 MG/1
30 CAPSULE, DELAYED RELEASE ORAL DAILY
Qty: 30 CAPSULE | Refills: 0 | Status: SHIPPED | OUTPATIENT
Start: 2018-04-23 | End: 2018-05-26 | Stop reason: SDUPTHER

## 2018-05-15 ENCOUNTER — OFFICE VISIT (OUTPATIENT)
Dept: URGENT CARE | Facility: MEDICAL CENTER | Age: 53
End: 2018-05-15
Payer: COMMERCIAL

## 2018-05-15 VITALS
SYSTOLIC BLOOD PRESSURE: 140 MMHG | HEIGHT: 66 IN | RESPIRATION RATE: 16 BRPM | OXYGEN SATURATION: 98 % | DIASTOLIC BLOOD PRESSURE: 90 MMHG | BODY MASS INDEX: 36 KG/M2 | HEART RATE: 68 BPM | WEIGHT: 224 LBS

## 2018-05-15 DIAGNOSIS — M54.2 NECK PAIN: Primary | ICD-10-CM

## 2018-05-26 DIAGNOSIS — F41.9 ANXIETY DISORDER: ICD-10-CM

## 2018-05-30 RX ORDER — DULOXETIN HYDROCHLORIDE 30 MG/1
30 CAPSULE, DELAYED RELEASE ORAL DAILY
Qty: 30 CAPSULE | Refills: 2 | Status: SHIPPED | OUTPATIENT
Start: 2018-05-30 | End: 2018-08-28 | Stop reason: SDUPTHER

## 2018-05-31 DIAGNOSIS — F41.9 ANXIETY DISORDER: ICD-10-CM

## 2018-05-31 RX ORDER — DULOXETIN HYDROCHLORIDE 30 MG/1
30 CAPSULE, DELAYED RELEASE ORAL DAILY
Qty: 30 CAPSULE | Refills: 0 | OUTPATIENT
Start: 2018-05-31

## 2018-07-02 ENCOUNTER — TELEPHONE (OUTPATIENT)
Dept: FAMILY MEDICINE CLINIC | Facility: CLINIC | Age: 53
End: 2018-07-02

## 2018-07-12 DIAGNOSIS — E66.09 CLASS 2 OBESITY DUE TO EXCESS CALORIES WITHOUT SERIOUS COMORBIDITY WITH BODY MASS INDEX (BMI) OF 39.0 TO 39.9 IN ADULT: Primary | ICD-10-CM

## 2018-07-13 RX ORDER — LIRAGLUTIDE 6 MG/ML
3 INJECTION, SOLUTION SUBCUTANEOUS DAILY
Qty: 1 PEN | Refills: 3 | Status: SHIPPED | OUTPATIENT
Start: 2018-07-13 | End: 2019-04-01 | Stop reason: SDUPTHER

## 2018-07-24 ENCOUNTER — TELEPHONE (OUTPATIENT)
Dept: FAMILY MEDICINE CLINIC | Facility: CLINIC | Age: 53
End: 2018-07-24

## 2018-07-29 DIAGNOSIS — I10 ESSENTIAL HYPERTENSION: ICD-10-CM

## 2018-07-29 DIAGNOSIS — I10 BENIGN ESSENTIAL HTN: ICD-10-CM

## 2018-07-30 RX ORDER — LISINOPRIL 5 MG/1
TABLET ORAL
Qty: 90 TABLET | Refills: 0 | Status: SHIPPED | OUTPATIENT
Start: 2018-07-30 | End: 2019-04-01 | Stop reason: SDUPTHER

## 2018-08-28 DIAGNOSIS — F41.9 ANXIETY DISORDER, UNSPECIFIED TYPE: ICD-10-CM

## 2018-08-28 RX ORDER — DULOXETIN HYDROCHLORIDE 30 MG/1
30 CAPSULE, DELAYED RELEASE ORAL DAILY
Qty: 30 CAPSULE | Refills: 0 | Status: SHIPPED | OUTPATIENT
Start: 2018-08-28 | End: 2019-04-01 | Stop reason: SDUPTHER

## 2018-09-11 RX ORDER — METAXALONE 800 MG/1
TABLET ORAL
COMMUNITY
Start: 2018-05-06 | End: 2019-03-04

## 2018-09-12 ENCOUNTER — OFFICE VISIT (OUTPATIENT)
Dept: FAMILY MEDICINE CLINIC | Facility: CLINIC | Age: 53
End: 2018-09-12
Payer: COMMERCIAL

## 2018-09-12 VITALS
HEIGHT: 66 IN | WEIGHT: 222 LBS | RESPIRATION RATE: 16 BRPM | SYSTOLIC BLOOD PRESSURE: 116 MMHG | TEMPERATURE: 96.9 F | HEART RATE: 80 BPM | BODY MASS INDEX: 35.68 KG/M2 | OXYGEN SATURATION: 98 % | DIASTOLIC BLOOD PRESSURE: 76 MMHG

## 2018-09-12 DIAGNOSIS — L20.89 OTHER ATOPIC DERMATITIS: ICD-10-CM

## 2018-09-12 DIAGNOSIS — E66.01 MORBID OBESITY (HCC): Primary | ICD-10-CM

## 2018-09-12 PROCEDURE — 1036F TOBACCO NON-USER: CPT | Performed by: PHYSICIAN ASSISTANT

## 2018-09-12 PROCEDURE — 3008F BODY MASS INDEX DOCD: CPT | Performed by: PHYSICIAN ASSISTANT

## 2018-09-12 PROCEDURE — 99213 OFFICE O/P EST LOW 20 MIN: CPT | Performed by: PHYSICIAN ASSISTANT

## 2018-09-12 RX ORDER — TRIAMCINOLONE ACETONIDE 5 MG/G
CREAM TOPICAL 2 TIMES DAILY
Qty: 30 G | Refills: 3 | Status: SHIPPED | OUTPATIENT
Start: 2018-09-12 | End: 2021-03-23

## 2018-09-12 NOTE — PROGRESS NOTES
Assessment/Plan:     Diagnoses and all orders for this visit:    Morbid obesity ([facility] [address] )  Comments:    Refill pen needles so patient may resume ssaxenda  Orders:  -     Insulin Pen Needle (BD PEN NEEDLE KAMILLA U/F) 32G X 4 MM MISC; Inject under the skin daily    Other atopic dermatitis  Comments:   atopic dermatitis instruction given  Topical steroid ordered  Orders:  -     triamcinolone (KENALOG) 0 5 % cream; Apply topically 2 (two) times a day    Other orders  -     metaxalone (SKELAXIN) 800 mg tablet; Take by mouth          Subjective:      Patient ID: Francesca Moody is a 48 y o  female  Patient presents with chronic reoccurring rash both hands  Rash is mainly on the  Seth and interdigital web spaces  Patient states the bubbles appear overnight  They are itchy  The and they gets scratched open  And then they are dry and peeling  The patient has tried Benadryl oral for the E itching  As well as Benadryl topical   Patient states when the areas are open she uses topical antibiotic ointment  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  Suggested patient wear gloves  The following portions of the patient's history were reviewed and updated as appropriate:   She  has a past medical history of Allergic reaction; Arthritis; Complete tear of anterior cruciate ligament of knee; Concussion (1979); Depression; and Varicella    She   Patient Active Problem List    Diagnosis Date Noted    Other atopic dermatitis 09/12/2018    Encounter for screening breast examination 03/28/2018    Morbid obesity ([facility] ) 07/12/2016    Patellofemoral arthritis of left knee 04/13/2016    Intracranial arachnoid cyst 07/21/2015    Cervical spondylosis without myelopathy 07/21/2015    Hydrocephalus 06/25/2015    Benign essential HTN 02/25/2015    Anxiety 08/08/2012     Current Outpatient Prescriptions   Medication Sig Dispense Refill    ALPRAZolam (XANAX) 0 25 mg tablet Take 1 tablet by mouth daily      aspirin 81 MG tablet Take by mouth daily      DULoxetine (CYMBALTA) 30 mg delayed release capsule Take 1 capsule (30 mg total) by mouth daily 30 capsule 0    Insulin Pen Needle (BD PEN NEEDLE KAMILLA U/F) 32G X 4 MM MISC Inject under the skin daily 50 each 0    lisinopril (ZESTRIL) 5 mg tablet TAKE 1 TABLET BY MOUTH EVERY DAY 90 tablet 0    SAXENDA injection Inject 0 5 mL (3 mg total) under the skin daily 1 pen 3    metaxalone (SKELAXIN) 800 mg tablet Take by mouth      triamcinolone (KENALOG) 0 5 % cream Apply topically 2 (two) times a day 30 g 3     No current facility-administered medications for this visit  Current Outpatient Prescriptions on File Prior to Visit   Medication Sig    ALPRAZolam (XANAX) 0 25 mg tablet Take 1 tablet by mouth daily    aspirin 81 MG tablet Take by mouth daily    DULoxetine (CYMBALTA) 30 mg delayed release capsule Take 1 capsule (30 mg total) by mouth daily    lisinopril (ZESTRIL) 5 mg tablet TAKE 1 TABLET BY MOUTH EVERY DAY    SAXENDA injection Inject 0 5 mL (3 mg total) under the skin daily    [DISCONTINUED] Insulin Pen Needle (BD PEN NEEDLE KAMILLA U/F) 32G X 4 MM MISC by Does not apply route     No current facility-administered medications on file prior to visit  She is allergic to sulfa antibiotics; cephalexin; clindamycin; and sulfamethoxazole-trimethoprim       Review of Systems   Constitutional: Negative for fever  Respiratory: Negative for cough  Cardiovascular: Negative for chest pain  Skin: Positive for rash  Objective:        Physical Exam   Constitutional: She is oriented to person, place, and time  She appears well-developed and well-nourished  Overweight  White  female   HENT:   Right Ear: External ear normal    Left Ear: External ear normal    Pulmonary/Chest: Effort normal    Musculoskeletal: She exhibits no edema  Neurological: She is alert and oriented to person, place, and time  Skin: Rash noted     The atopic dermatitis bilateral palms  Nursing note and vitals reviewed

## 2018-10-29 ENCOUNTER — OFFICE VISIT (OUTPATIENT)
Dept: FAMILY MEDICINE CLINIC | Facility: CLINIC | Age: 53
End: 2018-10-29
Payer: COMMERCIAL

## 2018-10-29 VITALS
SYSTOLIC BLOOD PRESSURE: 112 MMHG | OXYGEN SATURATION: 98 % | DIASTOLIC BLOOD PRESSURE: 74 MMHG | RESPIRATION RATE: 16 BRPM | WEIGHT: 224.4 LBS | BODY MASS INDEX: 36.07 KG/M2 | HEIGHT: 66 IN | TEMPERATURE: 97.5 F | HEART RATE: 80 BPM

## 2018-10-29 DIAGNOSIS — J01.00 ACUTE NON-RECURRENT MAXILLARY SINUSITIS: Primary | ICD-10-CM

## 2018-10-29 PROCEDURE — 3008F BODY MASS INDEX DOCD: CPT | Performed by: FAMILY MEDICINE

## 2018-10-29 PROCEDURE — 99213 OFFICE O/P EST LOW 20 MIN: CPT | Performed by: FAMILY MEDICINE

## 2018-10-29 RX ORDER — AMOXICILLIN 875 MG/1
875 TABLET, COATED ORAL 2 TIMES DAILY
Qty: 20 TABLET | Refills: 0 | Status: SHIPPED | OUTPATIENT
Start: 2018-10-29 | End: 2018-11-08

## 2018-10-29 NOTE — PROGRESS NOTES
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[employer], heavy lifting in the beer cafe  The following portions of the patient's history were reviewed and updated as appropriate: allergies, current medications, past family history, past medical history, past social history, past surgical history and problem list     Review of Systems      Objective:      /74 (BP Location: Right arm, Patient Position: Sitting, Cuff Size: Large)   Pulse 80   Temp 97 5 °F (36 4 °C)   Resp 16   Ht 5' 6" (1 676 m)   Wt 102 kg (224 lb 6 4 oz)   SpO2 98%   BMI 36 22 kg/m²          Physical Exam   Constitutional: She is oriented to person, place, and time  She appears well-developed and well-nourished  HENT:   Right Ear: External ear normal    Left Ear: External ear normal    Mouth/Throat: Oropharynx is clear and moist    B/l cerumen, partially impacted, tender over the max sinus   Neck: Normal range of motion  Neck supple  Cardiovascular: Normal rate, regular rhythm and normal heart sounds  Pulmonary/Chest: Effort normal and breath sounds normal    Lymphadenopathy:     She has no cervical adenopathy  Neurological: She is alert and oriented to person, place, and time  Skin: Skin is warm  Psychiatric: She has a normal mood and affect   Her behavior is normal  Judgment and thought content normal

## 2018-11-01 DIAGNOSIS — E66.01 MORBID OBESITY (HCC): ICD-10-CM

## 2019-03-04 ENCOUNTER — OFFICE VISIT (OUTPATIENT)
Dept: URGENT CARE | Facility: MEDICAL CENTER | Age: 54
End: 2019-03-04
Payer: COMMERCIAL

## 2019-03-04 VITALS
WEIGHT: 231 LBS | BODY MASS INDEX: 38.49 KG/M2 | OXYGEN SATURATION: 100 % | HEIGHT: 65 IN | DIASTOLIC BLOOD PRESSURE: 82 MMHG | SYSTOLIC BLOOD PRESSURE: 140 MMHG | HEART RATE: 88 BPM | RESPIRATION RATE: 18 BRPM | TEMPERATURE: 98.8 F

## 2019-03-04 DIAGNOSIS — M79.10 MUSCLE PAIN: Primary | ICD-10-CM

## 2019-03-04 PROCEDURE — 99213 OFFICE O/P EST LOW 20 MIN: CPT | Performed by: PHYSICIAN ASSISTANT

## 2019-03-04 RX ORDER — METHOCARBAMOL 500 MG/1
500 TABLET, FILM COATED ORAL 3 TIMES DAILY PRN
Qty: 20 TABLET | Refills: 0 | Status: SHIPPED | OUTPATIENT
Start: 2019-03-04 | End: 2021-03-24 | Stop reason: ALTCHOICE

## 2019-03-04 NOTE — PATIENT INSTRUCTIONS
Muscle Spasm   WHAT YOU NEED TO KNOW:   A muscle spasm is a sudden contraction of any muscle or group of muscles  A muscle cramp is a painful muscle spasm  Muscle cramps commonly occur after intense exercise or during pregnancy  They may also be caused by certain medications, dehydration, low calcium or magnesium levels, or another medical condition  DISCHARGE INSTRUCTIONS:   Medicines: You may need the following:  · NSAIDs  help decrease swelling and pain or fever  This medicine is available with or without a doctor's order  NSAIDs can cause stomach bleeding or kidney problems in certain people  If you take blood thinner medicine, always ask your healthcare provider if NSAIDs are safe for you  Always read the medicine label and follow directions  · Take your medicine as directed  Contact your healthcare provider if you think your medicine is not helping or if you have side effects  Tell him of her if you are allergic to any medicine  Keep a list of the medicines, vitamins, and herbs you take  Include the amounts, and when and why you take them  Bring the list or the pill bottles to follow-up visits  Carry your medicine list with you in case of an emergency  Follow up with your healthcare provider as directed: You may need other tests or treatment  You may also be referred to a physical therapist or other specialist  Write down your questions so you remember to ask them during your visits  Self-care:   · Stretch  your muscle to help relieve the cramp  It may be helpful to keep your muscle in the stretched position until the cramp is gone  · Apply heat  to help decrease pain and muscle spasms  Apply heat on the area for 20 to 30 minutes every 2 hours for as many days as directed  · Apply ice  to help decrease swelling and pain  Ice may also help prevent tissue damage  Use an ice pack, or put crushed ice in a plastic bag   Cover it with a towel and place it on your muscle for 15 to 20 minutes every hour or as directed  · Drink more liquids  to help prevent muscle cramps caused by dehydration  Sports drinks may help replace electrolytes you lose through sweat during exercise  Ask your healthcare provider how much liquid to drink each day and which liquids are best for you  · Eat healthy foods , such as fruits, vegetables, whole grains, low-fat dairy products, and lean proteins (meat, beans, and fish)  If you are pregnant, ask your healthcare provider about foods that are high in magnesium and sodium  They may help to relieve cramps during pregnancy  · Massage your muscle  to help relieve the cramp  · Take frequent deep breaths  until the cramp feels better  Lie down while you take the deep breaths so you do not get dizzy or lightheaded  Contact your healthcare provider if:   · You have signs of dehydration, such as a headache, dark yellow urine, dry eyes or mouth, or a fast heartbeat  · You have questions or concerns about your condition or care  Return to the emergency department if:   · You have warmth, swelling, or redness in the cramping muscle  · You have frequent or unrelieved muscle cramps in several different muscles  · You have muscle cramps with numbness, tingling, and burning in your hands and feet  © 2017 [address] Information is for End User's use only and may not be sold, redistributed or otherwise used for commercial purposes  All illustrations and images included in CareNotes® are the copyrighted property of [employer] , [employer]  or Nagi Erickson  The above information is an  only  It is not intended as medical advice for individual conditions or treatments  Talk to your doctor, nurse or pharmacist before following any medical regimen to see if it is safe and effective for you

## 2019-03-04 NOTE — PROGRESS NOTES
[facility]      NAME: Shonna Colin is a 48 y o  female  : 1965    MRN: [id number]  DATE: 2019  TIME: 12:41 PM    Assessment and Plan   Muscle pain [M79 10]  1  Muscle pain  methocarbamol (ROBAXIN) 500 mg tablet       Patient Instructions     Use muscle relaxers as directed  Tylenol/Motrin as needed for pain  Follow up with PCP in 24-48 hours  Follow up with PCP for health maintenance  Monitor for severe worsening of current symptoms   - Proceed to ER if symptoms worsen or if in distress -  Increase fluids and rest  Tylenol and Advil as needed for fever and chills  Chief Complaint     Chief Complaint   Patient presents with    Back Pain     Pt  c/o pain under L arm after shoveling snow yesterday , minimal relief with asprin          History of Present Illness   Shonna Colin presents to the clinic c/o    Patient has had intermittent left-sided muscle pain, since shoveling last night  Patient states she can see her muscle spasm  She tried taking aspirin, which not help  She states that is made worse with certain movements  She denies any chest pain, shortness breath difficulty breathing  She denies any worsening pain with exertion  She denies any falls  She denies any pain with deep inspiration  Review of Systems   Review of Systems   Constitutional: Negative for fever  Respiratory: Negative for cough            Current Medications     Long-Term Medications   Medication Sig Dispense Refill    aspirin 81 MG tablet Take by mouth daily      DULoxetine (CYMBALTA) 30 mg delayed release capsule Take 1 capsule (30 mg total) by mouth daily 30 capsule 0    lisinopril (ZESTRIL) 5 mg tablet TAKE 1 TABLET BY MOUTH EVERY DAY 90 tablet 0    triamcinolone (KENALOG) 0 5 % cream Apply topically 2 (two) times a day 30 g 3    ALPRAZolam (XANAX) 0 25 mg tablet Take 1 tablet by mouth daily      Insulin Pen Needle (BD PEN NEEDLE KAMILLA U/F) 32G X 4 MM MISC Inject under the skin daily (Patient not taking: Reported on 3/4/2019) 50 each 0    methocarbamol (ROBAXIN) 500 mg tablet Take 1 tablet (500 mg total) by mouth 3 (three) times a day as needed for muscle spasms 20 tablet 0    SAXENDA injection Inject 0 5 mL (3 mg total) under the skin daily (Patient not taking: Reported on 10/29/2018 ) 1 pen 3       Current Allergies     Allergies as of 03/04/2019 - Reviewed 03/04/2019   Allergen Reaction Noted    Sulfa antibiotics Hives 05/06/2018    Cephalexin  09/26/2015    Clindamycin  09/26/2015    Sulfamethoxazole-trimethoprim Rash 03/18/2014            The following portions of the patient's history were reviewed and updated as appropriate: allergies, current medications, past family history, past medical history, past social history, past surgical history and problem list     HISTORICAL INFO:  Past Medical History:   Diagnosis Date    Allergic reaction     last assessed: 03/18/2014    Anxiety     Arachnoid cyst     Arthritis     Complete tear of anterior cruciate ligament of knee     Concussion 1979    Fell from horse, treated [address]    Depression     Hypertension     Varicella     last assessed: 08/11/2014     Past Surgical History:   Procedure Laterality Date    GALLBLADDER SURGERY      KNEE SURGERY         Objective   /82   Pulse 88   Temp 98 8 °F (37 1 °C)   Resp 18   Ht 5' 5" (1 651 m)   Wt 105 kg (231 lb)   SpO2 100%   BMI 38 44 kg/m²        Physical Exam     Physical Exam   Constitutional: She appears well-developed and well-nourished  No distress  HENT:   Head: Normocephalic and atraumatic  Cardiovascular: Normal rate, regular rhythm and normal heart sounds  Pulmonary/Chest: Effort normal and breath sounds normal  No respiratory distress  She has no wheezes  She has no rales  Musculoskeletal:     Tenderness palpation of left lateral rib wall  No visible edema, bruising, ecchymosis  No visible feel chills    Patient has full active range of motion of left shoulders, without issue   strength 5/5 bilaterally  Skin: Skin is warm and dry  She is not diaphoretic  Nursing note and vitals reviewed  M*Modal software was used to dictate this note  It may contain errors with dictating incorrect words/spelling  Please contact provider directly for any questions       Edmond Martin PA-C

## 2019-04-01 ENCOUNTER — OFFICE VISIT (OUTPATIENT)
Dept: FAMILY MEDICINE CLINIC | Facility: CLINIC | Age: 54
End: 2019-04-01
Payer: COMMERCIAL

## 2019-04-01 VITALS
OXYGEN SATURATION: 99 % | HEART RATE: 78 BPM | DIASTOLIC BLOOD PRESSURE: 80 MMHG | RESPIRATION RATE: 16 BRPM | SYSTOLIC BLOOD PRESSURE: 142 MMHG | BODY MASS INDEX: 39.32 KG/M2 | WEIGHT: 236 LBS | HEIGHT: 65 IN | TEMPERATURE: 98 F

## 2019-04-01 DIAGNOSIS — F41.9 ANXIETY DISORDER, UNSPECIFIED TYPE: ICD-10-CM

## 2019-04-01 DIAGNOSIS — E66.09 CLASS 2 OBESITY DUE TO EXCESS CALORIES WITHOUT SERIOUS COMORBIDITY WITH BODY MASS INDEX (BMI) OF 39.0 TO 39.9 IN ADULT: ICD-10-CM

## 2019-04-01 DIAGNOSIS — I10 ESSENTIAL HYPERTENSION: ICD-10-CM

## 2019-04-01 DIAGNOSIS — I10 BENIGN ESSENTIAL HTN: ICD-10-CM

## 2019-04-01 DIAGNOSIS — E66.01 MORBID OBESITY (HCC): ICD-10-CM

## 2019-04-01 DIAGNOSIS — J01.00 ACUTE NON-RECURRENT MAXILLARY SINUSITIS: Primary | ICD-10-CM

## 2019-04-01 PROCEDURE — 99213 OFFICE O/P EST LOW 20 MIN: CPT | Performed by: PHYSICIAN ASSISTANT

## 2019-04-01 PROCEDURE — 1036F TOBACCO NON-USER: CPT | Performed by: PHYSICIAN ASSISTANT

## 2019-04-01 PROCEDURE — 3008F BODY MASS INDEX DOCD: CPT | Performed by: PHYSICIAN ASSISTANT

## 2019-04-01 RX ORDER — LIRAGLUTIDE 6 MG/ML
3 INJECTION, SOLUTION SUBCUTANEOUS DAILY
Qty: 1 PEN | Refills: 3 | Status: SHIPPED | OUTPATIENT
Start: 2019-04-01 | End: 2021-03-24 | Stop reason: SDUPTHER

## 2019-04-01 RX ORDER — DULOXETIN HYDROCHLORIDE 30 MG/1
30 CAPSULE, DELAYED RELEASE ORAL DAILY
Qty: 30 CAPSULE | Refills: 0 | Status: SHIPPED | OUTPATIENT
Start: 2019-04-01 | End: 2021-03-24 | Stop reason: SDUPTHER

## 2019-04-01 RX ORDER — AMOXICILLIN 500 MG/1
500 CAPSULE ORAL EVERY 8 HOURS SCHEDULED
Qty: 30 CAPSULE | Refills: 0 | Status: SHIPPED | OUTPATIENT
Start: 2019-04-01 | End: 2019-04-11

## 2019-04-01 RX ORDER — LISINOPRIL 5 MG/1
5 TABLET ORAL DAILY
Qty: 90 TABLET | Refills: 0 | Status: SHIPPED | OUTPATIENT
Start: 2019-04-01 | End: 2019-07-07 | Stop reason: SDUPTHER

## 2019-04-08 ENCOUNTER — APPOINTMENT (OUTPATIENT)
Dept: LAB | Facility: AMBULARY SURGERY CENTER | Age: 54
End: 2019-04-08
Payer: COMMERCIAL

## 2019-04-08 LAB
ALBUMIN SERPL BCP-MCNC: 3.8 G/DL (ref 3.5–5)
ALP SERPL-CCNC: 72 U/L (ref 46–116)
ALT SERPL W P-5'-P-CCNC: 18 U/L (ref 12–78)
ANION GAP SERPL CALCULATED.3IONS-SCNC: 3 MMOL/L (ref 4–13)
AST SERPL W P-5'-P-CCNC: 10 U/L (ref 5–45)
BASOPHILS # BLD AUTO: 0.07 THOUSANDS/ΜL (ref 0–0.1)
BASOPHILS NFR BLD AUTO: 1 % (ref 0–1)
BILIRUB SERPL-MCNC: 0.41 MG/DL (ref 0.2–1)
BUN SERPL-MCNC: 14 MG/DL (ref 5–25)
CALCIUM SERPL-MCNC: 8.8 MG/DL (ref 8.3–10.1)
CHLORIDE SERPL-SCNC: 104 MMOL/L (ref 100–108)
CHOLEST SERPL-MCNC: 180 MG/DL (ref 50–200)
CO2 SERPL-SCNC: 28 MMOL/L (ref 21–32)
CREAT SERPL-MCNC: 0.85 MG/DL (ref 0.6–1.3)
EOSINOPHIL # BLD AUTO: 0.38 THOUSAND/ΜL (ref 0–0.61)
EOSINOPHIL NFR BLD AUTO: 6 % (ref 0–6)
ERYTHROCYTE [DISTWIDTH] IN BLOOD BY AUTOMATED COUNT: 16.6 % (ref 11.6–15.1)
GFR SERPL CREATININE-BSD FRML MDRD: 78 ML/MIN/1.73SQ M
GLUCOSE P FAST SERPL-MCNC: 74 MG/DL (ref 65–99)
HCT VFR BLD AUTO: 35.1 % (ref 34.8–46.1)
HDLC SERPL-MCNC: 72 MG/DL (ref 40–60)
HGB BLD-MCNC: 10.6 G/DL (ref 11.5–15.4)
IMM GRANULOCYTES # BLD AUTO: 0.01 THOUSAND/UL (ref 0–0.2)
IMM GRANULOCYTES NFR BLD AUTO: 0 % (ref 0–2)
LDLC SERPL CALC-MCNC: 89 MG/DL (ref 0–100)
LYMPHOCYTES # BLD AUTO: 1.81 THOUSANDS/ΜL (ref 0.6–4.47)
LYMPHOCYTES NFR BLD AUTO: 28 % (ref 14–44)
MCH RBC QN AUTO: 24.4 PG (ref 26.8–34.3)
MCHC RBC AUTO-ENTMCNC: 30.2 G/DL (ref 31.4–37.4)
MCV RBC AUTO: 81 FL (ref 82–98)
MONOCYTES # BLD AUTO: 0.69 THOUSAND/ΜL (ref 0.17–1.22)
MONOCYTES NFR BLD AUTO: 11 % (ref 4–12)
NEUTROPHILS # BLD AUTO: 3.62 THOUSANDS/ΜL (ref 1.85–7.62)
NEUTS SEG NFR BLD AUTO: 54 % (ref 43–75)
NONHDLC SERPL-MCNC: 108 MG/DL
NRBC BLD AUTO-RTO: 0 /100 WBCS
PLATELET # BLD AUTO: 348 THOUSANDS/UL (ref 149–390)
PMV BLD AUTO: 10.4 FL (ref 8.9–12.7)
POTASSIUM SERPL-SCNC: 4.2 MMOL/L (ref 3.5–5.3)
PROT SERPL-MCNC: 7.7 G/DL (ref 6.4–8.2)
RBC # BLD AUTO: 4.35 MILLION/UL (ref 3.81–5.12)
SODIUM SERPL-SCNC: 135 MMOL/L (ref 136–145)
TRIGL SERPL-MCNC: 93 MG/DL
TSH SERPL DL<=0.05 MIU/L-ACNC: 0.98 UIU/ML (ref 0.36–3.74)
WBC # BLD AUTO: 6.58 THOUSAND/UL (ref 4.31–10.16)

## 2019-04-08 PROCEDURE — 84443 ASSAY THYROID STIM HORMONE: CPT | Performed by: PHYSICIAN ASSISTANT

## 2019-04-08 PROCEDURE — 80061 LIPID PANEL: CPT | Performed by: PHYSICIAN ASSISTANT

## 2019-04-08 PROCEDURE — 80053 COMPREHEN METABOLIC PANEL: CPT | Performed by: PHYSICIAN ASSISTANT

## 2019-04-08 PROCEDURE — 36415 COLL VENOUS BLD VENIPUNCTURE: CPT | Performed by: PHYSICIAN ASSISTANT

## 2019-04-08 PROCEDURE — 85025 COMPLETE CBC W/AUTO DIFF WBC: CPT | Performed by: PHYSICIAN ASSISTANT

## 2019-05-29 DIAGNOSIS — Z12.39 SCREENING BREAST EXAMINATION: Primary | ICD-10-CM

## 2019-07-07 DIAGNOSIS — I10 BENIGN ESSENTIAL HTN: ICD-10-CM

## 2019-07-07 DIAGNOSIS — I10 ESSENTIAL HYPERTENSION: ICD-10-CM

## 2019-07-07 RX ORDER — LISINOPRIL 5 MG/1
TABLET ORAL
Qty: 30 TABLET | Refills: 0 | Status: SHIPPED | OUTPATIENT
Start: 2019-07-07 | End: 2021-03-24 | Stop reason: ALTCHOICE

## 2019-08-20 ENCOUNTER — TELEPHONE (OUTPATIENT)
Dept: FAMILY MEDICINE CLINIC | Facility: CLINIC | Age: 54
End: 2019-08-20

## 2019-08-20 NOTE — TELEPHONE ENCOUNTER
----- Message from Harriet Mcconnell LPN sent at 2/03/4894  1:25 PM EDT -----  Regarding: Mammogram  Patient was given order for mammo in May  Please inquire if she needs assistance with scheduling  Called & LM for pt to return call to office

## 2020-06-10 PROBLEM — J01.00 ACUTE NON-RECURRENT MAXILLARY SINUSITIS: Status: RESOLVED | Noted: 2019-04-01 | Resolved: 2020-06-10

## 2020-08-17 ENCOUNTER — TELEPHONE (OUTPATIENT)
Dept: FAMILY MEDICINE CLINIC | Facility: CLINIC | Age: 55
End: 2020-08-17

## 2021-03-24 ENCOUNTER — OFFICE VISIT (OUTPATIENT)
Dept: FAMILY MEDICINE CLINIC | Facility: CLINIC | Age: 56
End: 2021-03-24
Payer: COMMERCIAL

## 2021-03-24 VITALS
SYSTOLIC BLOOD PRESSURE: 128 MMHG | HEIGHT: 66 IN | DIASTOLIC BLOOD PRESSURE: 72 MMHG | HEART RATE: 58 BPM | TEMPERATURE: 97.4 F | RESPIRATION RATE: 18 BRPM | BODY MASS INDEX: 37.12 KG/M2 | OXYGEN SATURATION: 99 % | WEIGHT: 231 LBS

## 2021-03-24 DIAGNOSIS — E66.09 CLASS 2 OBESITY DUE TO EXCESS CALORIES WITHOUT SERIOUS COMORBIDITY WITH BODY MASS INDEX (BMI) OF 39.0 TO 39.9 IN ADULT: ICD-10-CM

## 2021-03-24 DIAGNOSIS — E66.01 MORBID OBESITY (HCC): ICD-10-CM

## 2021-03-24 DIAGNOSIS — Z12.39 ENCOUNTER FOR SCREENING BREAST EXAMINATION: ICD-10-CM

## 2021-03-24 DIAGNOSIS — I10 BENIGN ESSENTIAL HTN: Primary | ICD-10-CM

## 2021-03-24 DIAGNOSIS — F41.9 ANXIETY DISORDER, UNSPECIFIED TYPE: ICD-10-CM

## 2021-03-24 PROCEDURE — 99214 OFFICE O/P EST MOD 30 MIN: CPT | Performed by: PHYSICIAN ASSISTANT

## 2021-03-24 RX ORDER — PEN NEEDLE, DIABETIC 32GX 5/32"
NEEDLE, DISPOSABLE MISCELLANEOUS DAILY
Qty: 50 EACH | Refills: 3 | Status: SHIPPED | OUTPATIENT
Start: 2021-03-24 | End: 2021-10-14

## 2021-03-24 RX ORDER — LIRAGLUTIDE 6 MG/ML
3 INJECTION, SOLUTION SUBCUTANEOUS DAILY
Qty: 1 PEN | Refills: 3 | Status: SHIPPED | OUTPATIENT
Start: 2021-03-24

## 2021-03-24 RX ORDER — DULOXETIN HYDROCHLORIDE 30 MG/1
30 CAPSULE, DELAYED RELEASE ORAL DAILY
Qty: 90 CAPSULE | Refills: 1 | Status: SHIPPED | OUTPATIENT
Start: 2021-03-24 | End: 2021-10-14

## 2021-03-24 NOTE — PROGRESS NOTES
BMI Counseling: Body mass index is 37 28 kg/m²  The BMI is above normal  Nutrition recommendations include decreasing portion sizes and moderation in carbohydrate intake  Pharmacotherapy was ordered to help aid in weight loss  Assessment/Plan:     Diagnoses and all orders for this visit:    Benign essential HTN  Comments:  BP is normal off all medication  Check labs  Orders:  -     Comprehensive metabolic panel  -     Lipid panel    Morbid obesity (HCC)  Comments:  Exercise to promote weight loss  Orders:  -     TSH, 3rd generation  -     Insulin Pen Needle (BD Pen Needle Mona U/F) 32G X 4 MM MISC; Inject under the skin daily    Anxiety disorder, unspecified type  Comments:  Patient would like to restart Cymbalta in for for chronic pain in for anxiety  Orders:  -     DULoxetine (CYMBALTA) 30 mg delayed release capsule; Take 1 capsule (30 mg total) by mouth daily    Encounter for screening breast examination  -     Mammo screening bilateral w cad; Future    Class 2 obesity due to excess calories without serious comorbidity with body mass index (BMI) of 39 0 to 39 9 in adult  Comments:  Reorder Saxenda  Orders:  -     Saxenda injection; Inject 0 5 mL (3 mg total) under the skin daily    Morbid obesity ([address] )  Comments:    Refill pen needles so patient may resume ssaxenda  Orders:  -     TSH, 3rd generation  -     Insulin Pen Needle (BD Pen Needle Mona U/F) 32G X 4 MM MISC; Inject under the skin daily          Subjective:      Patient ID: Delma Dan is a 54 y o  female  Presents in the office for follow up chronic conditions  Patient has hypertension  He has been off her lisinopril for months  Her blood pressure in the office today is normal   History of hydrocephalus no shunt  History of anxiety  She is off Cymbalta and alprazolam   Patient would like to go back on Cymbalta more for pain control due to her chronic back pain        The following portions of the patient's history were reviewed and updated as appropriate:   She   Patient Active Problem List    Diagnosis Date Noted    Other atopic dermatitis 09/12/2018    Encounter for screening breast examination 03/28/2018    Morbid obesity ([address] ) 07/12/2016    Patellofemoral arthritis of left knee 04/13/2016    Intracranial arachnoid cyst 07/21/2015    Cervical spondylosis without myelopathy 07/21/2015    Hydrocephalus (HCC) 06/25/2015    Benign essential HTN 02/25/2015    Anxiety 08/08/2012     Current Outpatient Medications   Medication Sig Dispense Refill    ALPRAZolam (XANAX) 0 25 mg tablet Take 1 tablet by mouth daily      aspirin 81 MG tablet Take by mouth daily      DULoxetine (CYMBALTA) 30 mg delayed release capsule Take 1 capsule (30 mg total) by mouth daily 90 capsule 1    Insulin Pen Needle (BD Pen Needle Mona U/F) 32G X 4 MM MISC Inject under the skin daily 50 each 3    Saxenda injection Inject 0 5 mL (3 mg total) under the skin daily 1 pen 3     No current facility-administered medications for this visit  She is allergic to sulfa antibiotics; cephalexin; clindamycin; and sulfamethoxazole-trimethoprim       Review of Systems   Constitutional: Negative for activity change, appetite change and unexpected weight change  HENT: Negative for ear pain and sore throat  Eyes: Negative for visual disturbance  Respiratory: Negative for cough, shortness of breath and wheezing  Cardiovascular: Negative for chest pain and leg swelling  Gastrointestinal: Negative for abdominal pain, blood in stool, constipation, diarrhea, nausea and vomiting  Genitourinary: Negative for difficulty urinating  Musculoskeletal: Positive for back pain  Negative for arthralgias and myalgias  Skin: Negative for rash  Neurological: Negative for dizziness, syncope, light-headedness and headaches  Psychiatric/Behavioral: Negative for self-injury, sleep disturbance and suicidal ideas  The patient is not nervous/anxious  Objective:        Physical Exam  Vitals signs and nursing note reviewed  Constitutional:       General: She is not in acute distress  Appearance: Normal appearance  She is well-developed  She is obese  She is not diaphoretic  HENT:      Head: Normocephalic and atraumatic  Right Ear: Tympanic membrane, ear canal and external ear normal       Left Ear: Tympanic membrane, ear canal and external ear normal       Mouth/Throat:      Pharynx: No posterior oropharyngeal erythema  Eyes:      Conjunctiva/sclera: Conjunctivae normal       Pupils: Pupils are equal, round, and reactive to light  Neck:      Thyroid: No thyromegaly  Vascular: No carotid bruit  Cardiovascular:      Rate and Rhythm: Normal rate and regular rhythm  Heart sounds: Normal heart sounds  No murmur  No friction rub  No gallop  Pulmonary:      Effort: Pulmonary effort is normal  No respiratory distress  Breath sounds: Normal breath sounds  No wheezing  Abdominal:      General: Bowel sounds are normal  There is no distension  Palpations: Abdomen is soft  There is no mass  Tenderness: There is no abdominal tenderness  Musculoskeletal:      Right lower leg: No edema  Lymphadenopathy:      Cervical: No cervical adenopathy  Skin:     General: Skin is warm and dry  Findings: No erythema or rash  Neurological:      General: No focal deficit present  Mental Status: She is alert and oriented to person, place, and time  Psychiatric:         Mood and Affect: Mood normal          Behavior: Behavior normal          Thought Content:  Thought content normal          Judgment: Judgment normal

## 2021-04-30 ENCOUNTER — APPOINTMENT (OUTPATIENT)
Dept: LAB | Facility: MEDICAL CENTER | Age: 56
End: 2021-04-30
Payer: COMMERCIAL

## 2021-04-30 LAB
ALBUMIN SERPL BCP-MCNC: 4 G/DL (ref 3.5–5)
ALP SERPL-CCNC: 84 U/L (ref 46–116)
ALT SERPL W P-5'-P-CCNC: 19 U/L (ref 12–78)
ANION GAP SERPL CALCULATED.3IONS-SCNC: 5 MMOL/L (ref 4–13)
AST SERPL W P-5'-P-CCNC: 14 U/L (ref 5–45)
BILIRUB SERPL-MCNC: 0.47 MG/DL (ref 0.2–1)
BUN SERPL-MCNC: 15 MG/DL (ref 5–25)
CALCIUM SERPL-MCNC: 9.5 MG/DL (ref 8.3–10.1)
CHLORIDE SERPL-SCNC: 105 MMOL/L (ref 100–108)
CHOLEST SERPL-MCNC: 207 MG/DL (ref 50–200)
CO2 SERPL-SCNC: 27 MMOL/L (ref 21–32)
CREAT SERPL-MCNC: 0.82 MG/DL (ref 0.6–1.3)
GFR SERPL CREATININE-BSD FRML MDRD: 81 ML/MIN/1.73SQ M
GLUCOSE P FAST SERPL-MCNC: 81 MG/DL (ref 65–99)
HDLC SERPL-MCNC: 84 MG/DL
LDLC SERPL CALC-MCNC: 109 MG/DL (ref 0–100)
NONHDLC SERPL-MCNC: 123 MG/DL
POTASSIUM SERPL-SCNC: 5 MMOL/L (ref 3.5–5.3)
PROT SERPL-MCNC: 7.5 G/DL (ref 6.4–8.2)
SODIUM SERPL-SCNC: 137 MMOL/L (ref 136–145)
TRIGL SERPL-MCNC: 68 MG/DL
TSH SERPL DL<=0.05 MIU/L-ACNC: 0.72 UIU/ML (ref 0.36–3.74)

## 2021-04-30 PROCEDURE — 36415 COLL VENOUS BLD VENIPUNCTURE: CPT | Performed by: PHYSICIAN ASSISTANT

## 2021-04-30 PROCEDURE — 84443 ASSAY THYROID STIM HORMONE: CPT | Performed by: PHYSICIAN ASSISTANT

## 2021-04-30 PROCEDURE — 80061 LIPID PANEL: CPT | Performed by: PHYSICIAN ASSISTANT

## 2021-04-30 PROCEDURE — 80053 COMPREHEN METABOLIC PANEL: CPT | Performed by: PHYSICIAN ASSISTANT

## 2021-05-18 ENCOUNTER — HOSPITAL ENCOUNTER (OUTPATIENT)
Dept: RADIOLOGY | Age: 56
Discharge: HOME | End: 2021-05-18
Payer: COMMERCIAL

## 2021-05-18 VITALS — BODY MASS INDEX: 37.12 KG/M2 | HEIGHT: 66 IN | WEIGHT: 231 LBS

## 2021-05-18 DIAGNOSIS — Z12.39 ENCOUNTER FOR SCREENING BREAST EXAMINATION: ICD-10-CM

## 2021-05-18 DIAGNOSIS — Z12.31 ENCOUNTER FOR SCREENING MAMMOGRAM FOR MALIGNANT NEOPLASM OF BREAST: ICD-10-CM

## 2021-05-18 PROCEDURE — 77063 BREAST TOMOSYNTHESIS BI: CPT

## 2021-05-18 PROCEDURE — 77067 SCR MAMMO BI INCL CAD: CPT

## 2021-10-14 ENCOUNTER — OFFICE VISIT (OUTPATIENT)
Dept: FAMILY MEDICINE CLINIC | Facility: CLINIC | Age: 56
End: 2021-10-14

## 2021-10-14 VITALS
TEMPERATURE: 97.6 F | RESPIRATION RATE: 18 BRPM | OXYGEN SATURATION: 98 % | WEIGHT: 223 LBS | SYSTOLIC BLOOD PRESSURE: 138 MMHG | HEIGHT: 66 IN | DIASTOLIC BLOOD PRESSURE: 80 MMHG | BODY MASS INDEX: 35.84 KG/M2 | HEART RATE: 70 BPM

## 2021-10-14 DIAGNOSIS — E66.09 CLASS 2 OBESITY DUE TO EXCESS CALORIES WITHOUT SERIOUS COMORBIDITY WITH BODY MASS INDEX (BMI) OF 39.0 TO 39.9 IN ADULT: ICD-10-CM

## 2021-10-14 DIAGNOSIS — M47.812 CERVICAL SPONDYLOSIS WITHOUT MYELOPATHY: ICD-10-CM

## 2021-10-14 DIAGNOSIS — I10 BENIGN ESSENTIAL HTN: Primary | ICD-10-CM

## 2021-10-14 DIAGNOSIS — G91.9 HYDROCEPHALUS, UNSPECIFIED TYPE (HCC): ICD-10-CM

## 2021-10-14 DIAGNOSIS — F41.9 ANXIETY: ICD-10-CM

## 2021-10-14 PROCEDURE — 99213 OFFICE O/P EST LOW 20 MIN: CPT | Performed by: PHYSICIAN ASSISTANT

## 2022-10-04 ENCOUNTER — OFFICE VISIT (OUTPATIENT)
Dept: FAMILY MEDICINE CLINIC | Facility: CLINIC | Age: 57
End: 2022-10-04
Payer: COMMERCIAL

## 2022-10-04 VITALS
TEMPERATURE: 98.3 F | HEART RATE: 83 BPM | SYSTOLIC BLOOD PRESSURE: 140 MMHG | WEIGHT: 217.8 LBS | OXYGEN SATURATION: 100 % | HEIGHT: 66 IN | BODY MASS INDEX: 35 KG/M2 | DIASTOLIC BLOOD PRESSURE: 80 MMHG

## 2022-10-04 DIAGNOSIS — I10 BENIGN ESSENTIAL HTN: Primary | ICD-10-CM

## 2022-10-04 DIAGNOSIS — E66.01 MORBID OBESITY (HCC): ICD-10-CM

## 2022-10-04 DIAGNOSIS — G91.9 HYDROCEPHALUS, UNSPECIFIED TYPE (HCC): ICD-10-CM

## 2022-10-04 DIAGNOSIS — F41.9 ANXIETY: ICD-10-CM

## 2022-10-04 DIAGNOSIS — Z12.31 SCREENING MAMMOGRAM FOR BREAST CANCER: ICD-10-CM

## 2022-10-04 DIAGNOSIS — Z23 ENCOUNTER FOR IMMUNIZATION: ICD-10-CM

## 2022-10-04 PROCEDURE — 90682 RIV4 VACC RECOMBINANT DNA IM: CPT

## 2022-10-04 PROCEDURE — 90471 IMMUNIZATION ADMIN: CPT

## 2022-10-04 PROCEDURE — 99214 OFFICE O/P EST MOD 30 MIN: CPT | Performed by: PHYSICIAN ASSISTANT

## 2022-10-04 RX ORDER — ALPRAZOLAM 0.25 MG/1
0.25 TABLET ORAL
Qty: 30 TABLET | Refills: 0 | Status: SHIPPED | OUTPATIENT
Start: 2022-10-04

## 2022-10-04 NOTE — PROGRESS NOTES
BMI Counseling: Body mass index is 35 15 kg/m²  The BMI is above normal  Nutrition recommendations include decreasing portion sizes and moderation in carbohydrate intake  Exercise recommendations include exercising 3-5 times per week  Rationale for BMI follow-up plan is due to patient being overweight or obese  Depression Screening and Follow-up Plan: Patient was screened for depression during today's encounter  They screened negative with a PHQ-2 score of 0  Assessment/Plan:     Diagnoses and all orders for this visit:    Benign essential HTN  Comments:  Patient is off all her medication we will continue to observe her blood pressure  Proceed with labs  Orders:  -     Comprehensive metabolic panel  -     Lipid panel    Hydrocephalus, unspecified type (HCC)    Morbid obesity ([facility] )  Comments:  Continue exercise and diet to promote weight loss    Anxiety  Comments:  More situational due to 's illness  P r n  Alprazolam  Orders:  -     ALPRAZolam (XANAX) 0 25 mg tablet; Take 1 tablet (0 25 mg total) by mouth daily at bedtime as needed for anxiety    Screening mammogram for breast cancer  -     Mammo screening bilateral w 3d & cad; Future          Subjective:      Patient ID: Nadira Nino is a 62 y o  female  Patient presents in the office for follow up chronic conditions  Patient has a history of hypertension  She has been off all her medications  Blood pressure is elevated today  History of hydrocephalysis  No shunt  Patient has a history of anxiety  Patient's  also recently diagnosed with lung cancer  He had some increased anxiety  Patient has taken alprazolam in the past as needed        The following portions of the patient's history were reviewed and updated as appropriate:   She   Patient Active Problem List    Diagnosis Date Noted    Other atopic dermatitis 09/12/2018    Encounter for screening breast examination 03/28/2018    Morbid obesity ([facility] ) 07/12/2016    Patellofemoral arthritis of left knee 04/13/2016    Intracranial arachnoid cyst 07/21/2015    Cervical spondylosis without myelopathy 07/21/2015    Hydrocephalus (HCC) 06/25/2015    Benign essential HTN 02/25/2015    Anxiety 08/08/2012     Current Outpatient Medications   Medication Sig Dispense Refill    ALPRAZolam (XANAX) 0 25 mg tablet Take 1 tablet (0 25 mg total) by mouth daily at bedtime as needed for anxiety 30 tablet 0     No current facility-administered medications for this visit  She is allergic to sulfa antibiotics, cephalexin, clindamycin, and sulfamethoxazole-trimethoprim       Review of Systems   Constitutional: Negative for activity change and unexpected weight change  HENT: Negative for ear pain and sore throat  Eyes: Negative for visual disturbance  Respiratory: Negative for cough, shortness of breath and wheezing  Cardiovascular: Negative for chest pain and leg swelling  Gastrointestinal: Negative for abdominal pain, blood in stool, constipation, diarrhea, nausea and vomiting  Genitourinary: Negative for difficulty urinating  Musculoskeletal: Negative for arthralgias and myalgias  Skin: Negative for rash  Neurological: Negative for dizziness, syncope, light-headedness and headaches  Psychiatric/Behavioral: Negative for self-injury, sleep disturbance and suicidal ideas  The patient is not nervous/anxious  Objective:        Physical Exam  Vitals and nursing note reviewed  Constitutional:       General: She is not in acute distress  Appearance: Normal appearance  She is well-developed  She is not diaphoretic  Comments: overweight   HENT:      Head: Normocephalic and atraumatic  Right Ear: Ear canal and external ear normal  There is impacted cerumen  Left Ear: Ear canal and external ear normal  There is impacted cerumen  Mouth/Throat:      Pharynx: No posterior oropharyngeal erythema     Eyes:      Conjunctiva/sclera: Conjunctivae normal       Pupils: Pupils are equal, round, and reactive to light  Neck:      Thyroid: No thyromegaly  Vascular: No carotid bruit  Cardiovascular:      Rate and Rhythm: Normal rate and regular rhythm  Heart sounds: Normal heart sounds  No murmur heard  No friction rub  No gallop  Pulmonary:      Effort: Pulmonary effort is normal  No respiratory distress  Breath sounds: Normal breath sounds  No wheezing  Abdominal:      General: Bowel sounds are normal  There is no distension  Palpations: Abdomen is soft  There is no mass  Tenderness: There is no abdominal tenderness  Musculoskeletal:      Right lower leg: No edema  Left lower leg: No edema  Lymphadenopathy:      Cervical: No cervical adenopathy  Skin:     General: Skin is warm and dry  Findings: No erythema or rash  Neurological:      Mental Status: She is alert and oriented to person, place, and time  Psychiatric:         Mood and Affect: Mood normal          Behavior: Behavior normal          Thought Content:  Thought content normal          Judgment: Judgment normal

## 2022-12-20 ENCOUNTER — RA CDI HCC (OUTPATIENT)
Dept: OTHER | Facility: HOSPITAL | Age: 57
End: 2022-12-20

## 2022-12-20 NOTE — PROGRESS NOTES
E66 01: Morbid (severe) obesity due to excess calories (HCC)     Per CMS/ICD 10 coding guidelines, to be used when BMI > 35 & <40 with one or more comorbidity (DM, HTN, or ALTHEA)    [facility]  coding opportunities          Chart Reviewed number of suggestions sent to Provider: 1     Patients Insurance        Commercial Insurance: [address] [place] [address]

## 2023-01-09 ENCOUNTER — TELEPHONE (OUTPATIENT)
Dept: OTHER | Facility: OTHER | Age: 58
End: 2023-01-09

## 2023-01-09 NOTE — TELEPHONE ENCOUNTER
Patient is calling regarding cancelling an appointment      Date/Time: 01/10 @09:00    Patient was rescheduled: YES [] NO [x]    Patient requesting call back to reschedule: YES [] NO [x]

## 2023-03-21 ENCOUNTER — OFFICE VISIT (OUTPATIENT)
Dept: FAMILY MEDICINE CLINIC | Facility: CLINIC | Age: 58
End: 2023-03-21

## 2023-03-21 ENCOUNTER — APPOINTMENT (OUTPATIENT)
Dept: RADIOLOGY | Facility: MEDICAL CENTER | Age: 58
End: 2023-03-21

## 2023-03-21 VITALS
OXYGEN SATURATION: 98 % | HEART RATE: 68 BPM | TEMPERATURE: 97.1 F | BODY MASS INDEX: 34.94 KG/M2 | SYSTOLIC BLOOD PRESSURE: 138 MMHG | HEIGHT: 66 IN | DIASTOLIC BLOOD PRESSURE: 82 MMHG | WEIGHT: 217.4 LBS

## 2023-03-21 DIAGNOSIS — F41.9 ANXIETY: ICD-10-CM

## 2023-03-21 DIAGNOSIS — G93.0 INTRACRANIAL ARACHNOID CYST: ICD-10-CM

## 2023-03-21 DIAGNOSIS — G91.9 HYDROCEPHALUS, UNSPECIFIED TYPE (HCC): ICD-10-CM

## 2023-03-21 DIAGNOSIS — M25.562 ACUTE PAIN OF LEFT KNEE: ICD-10-CM

## 2023-03-21 DIAGNOSIS — E66.01 MORBID OBESITY (HCC): ICD-10-CM

## 2023-03-21 DIAGNOSIS — I10 BENIGN ESSENTIAL HTN: Primary | ICD-10-CM

## 2023-03-21 RX ORDER — ALPRAZOLAM 0.25 MG/1
0.25 TABLET ORAL
Qty: 30 TABLET | Refills: 0 | Status: SHIPPED | OUTPATIENT
Start: 2023-03-21

## 2023-03-21 NOTE — PROGRESS NOTES
Assessment/Plan:     Diagnoses and all orders for this visit:    Benign essential HTN  Comments:  Patient is off all medications  Her blood pressure is acceptable  Orders:  -     Comprehensive metabolic panel  -     Lipid panel    Hydrocephalus, unspecified type (HCC)  Comments:  She has no symptoms  Hold off on imaging    Anxiety  Comments:  Refill alprazolam for as needed use  Orders:  -     ALPRAZolam (XANAX) 0 25 mg tablet; Take 1 tablet (0 25 mg total) by mouth daily at bedtime as needed for anxiety    Morbid obesity (HCC)    Acute pain of left knee  Comments:  Obtain x-ray and refer to orthopedics  Orders:  -     XR knee 3 vw left non injury; Future  -     Ambulatory Referral to Orthopedic Surgery; Future    Intracranial arachnoid cyst    Anxiety  Comments:  More situational due to 's illness  P r n  Alprazolam  Orders:  -     ALPRAZolam (XANAX) 0 25 mg tablet; Take 1 tablet (0 25 mg total) by mouth daily at bedtime as needed for anxiety          Subjective:      Patient ID: Femi Jeffries is a 62 y o  female  Presents in the office for knee pain  Patient does have a history of arthritis in the knee  She twisted it at work  Had pain ever since  Is limping with ambulation  Notices swelling at the end of the day  And also has a history of anxiety  She is having some social stressors  Family is living with her  She will need some Xanax refilled  She has a history of hydrocephalus no  shunt  Patient follows with neurology  3 of hypertension  She is off all medication    Her blood pressure is acceptable      The following portions of the patient's history were reviewed and updated as appropriate:   She   Patient Active Problem List    Diagnosis Date Noted   • Other atopic dermatitis 09/12/2018   • Encounter for screening breast examination 03/28/2018   • Morbid obesity ([facility] ) 07/12/2016   • Patellofemoral arthritis of left knee 04/13/2016   • Intracranial arachnoid cyst 07/21/2015   • Cervical spondylosis without myelopathy 07/21/2015   • Hydrocephalus ([facility] ) 06/25/2015   • Benign essential HTN 02/25/2015   • Anxiety 08/08/2012     Current Outpatient Medications   Medication Sig Dispense Refill   • ALPRAZolam (XANAX) 0 25 mg tablet Take 1 tablet (0 25 mg total) by mouth daily at bedtime as needed for anxiety 30 tablet 0     No current facility-administered medications for this visit  She is allergic to sulfa antibiotics, cephalexin, clindamycin, and sulfamethoxazole-trimethoprim       Review of Systems   Constitutional: Negative for activity change and unexpected weight change  HENT: Negative for ear pain and sore throat  Eyes: Negative for visual disturbance  Floater  Left  eye   Respiratory: Negative for cough, shortness of breath and wheezing  Cardiovascular: Negative for chest pain and leg swelling  Gastrointestinal: Negative for abdominal pain, blood in stool, constipation, diarrhea, nausea and vomiting  Genitourinary: Negative for difficulty urinating  Musculoskeletal: Positive for arthralgias  Negative for myalgias  Skin: Negative for rash  Neurological: Negative for dizziness, syncope, light-headedness and headaches  Psychiatric/Behavioral: Negative for self-injury, sleep disturbance and suicidal ideas  The patient is nervous/anxious  Objective:        Physical Exam  Constitutional:       General: She is not in acute distress  Appearance: She is well-developed  She is obese  She is not diaphoretic  HENT:      Head: Normocephalic and atraumatic  Right Ear: Tympanic membrane, ear canal and external ear normal       Left Ear: Tympanic membrane, ear canal and external ear normal       Mouth/Throat:      Pharynx: No posterior oropharyngeal erythema  Eyes:      Conjunctiva/sclera: Conjunctivae normal       Pupils: Pupils are equal, round, and reactive to light  Neck:      Thyroid: No thyromegaly  Vascular: No carotid bruit  Cardiovascular:      Rate and Rhythm: Normal rate and regular rhythm  Heart sounds: Normal heart sounds  No murmur heard  No friction rub  No gallop  Pulmonary:      Effort: Pulmonary effort is normal  No respiratory distress  Breath sounds: Normal breath sounds  No wheezing  Abdominal:      General: Bowel sounds are normal  There is no distension  Palpations: Abdomen is soft  There is no mass  Tenderness: There is no abdominal tenderness  Musculoskeletal:      Right lower leg: No edema  Left lower leg: No edema  Lymphadenopathy:      Cervical: No cervical adenopathy  Skin:     General: Skin is warm and dry  Findings: No erythema or rash  Neurological:      General: No focal deficit present  Mental Status: She is alert and oriented to person, place, and time  Psychiatric:         Mood and Affect: Mood normal          Behavior: Behavior normal          Thought Content:  Thought content normal          Judgment: Judgment normal

## 2023-03-29 ENCOUNTER — OFFICE VISIT (OUTPATIENT)
Dept: OBGYN CLINIC | Facility: CLINIC | Age: 58
End: 2023-03-29

## 2023-03-29 VITALS
HEART RATE: 65 BPM | BODY MASS INDEX: 34.97 KG/M2 | HEIGHT: 66 IN | WEIGHT: 217.6 LBS | DIASTOLIC BLOOD PRESSURE: 99 MMHG | SYSTOLIC BLOOD PRESSURE: 187 MMHG

## 2023-03-29 DIAGNOSIS — M25.562 ACUTE PAIN OF LEFT KNEE: ICD-10-CM

## 2023-03-29 DIAGNOSIS — M17.12 PRIMARY OSTEOARTHRITIS OF LEFT KNEE: Primary | ICD-10-CM

## 2023-03-29 RX ORDER — BUPIVACAINE HYDROCHLORIDE 2.5 MG/ML
4 INJECTION, SOLUTION INFILTRATION; PERINEURAL
Status: COMPLETED | OUTPATIENT
Start: 2023-03-29 | End: 2023-03-29

## 2023-03-29 RX ORDER — TRIAMCINOLONE ACETONIDE 40 MG/ML
40 INJECTION, SUSPENSION INTRA-ARTICULAR; INTRAMUSCULAR
Status: COMPLETED | OUTPATIENT
Start: 2023-03-29 | End: 2023-03-29

## 2023-03-29 RX ADMIN — TRIAMCINOLONE ACETONIDE 40 MG: 40 INJECTION, SUSPENSION INTRA-ARTICULAR; INTRAMUSCULAR at 08:55

## 2023-03-29 RX ADMIN — BUPIVACAINE HYDROCHLORIDE 4 ML: 2.5 INJECTION, SOLUTION INFILTRATION; PERINEURAL at 08:55

## 2023-03-29 NOTE — PROGRESS NOTES
Ortho Sports Medicine Knee New Patient Visit     Assesment:   62 y o  female left knee osteoarthritis    Plan:    The patient's findings and exam are consistent with left knee osteoarthritis  I discussed at length today with the patient that this can be treated conservatively at this time with physical therapy, corticosteroid or viscosupplementation injections, and OTC NSAIDs  I provided the patient with a corticosteroid injection at today's visit and a referral to formal physical therapy 1-2 times a week for 8 weeks  I dicussed with her that if symptoms worsen we can look into the next steps of a viscosupplementation  She can follow-up in 8 weeks or as needed  Conservative treatment:    Ice to knee for 20 minutes at least 1-2 times daily  PT for ROM/strengthening to knee, hip and core  OTC NSAIDS prn for pain  Let pain guide gradual return activities  Imaging: All imaging from today was reviewed by myself and explained to the patient  Injection:    The risks and benefits of the injection (which include but are not limited to: infection, bleeding,damage to nerve/artery, need for further intervention), as well as the risks and benefits of all alternative treatments were explained and understood  The patient elected to proceed with injection  The procedure was done with aseptic technique, and the patient tolerated the procedure well with no complications  A corticosteroid injection was performed  Surgery:     No surgery is recommended at this point, continue with conservative treatment plan as noted  Follow up:    No follow-ups on file  Chief Complaint   Patient presents with   • Left Knee - Pain       History of Present Illness: The patient is a 62 y o  female whose occupation is self-employed, referred to me by their primary care physician, seen in clinic for evaluation of left knee pain  Pain is located anterior, posterior, lateral   The patient rates the pain as a 7/10  The patient states her pain has been present for a couple years now with it becoming worse more recently  She states she fell at work approximately 4 years ago and had concern that her pain may be from that previous injury  Her pain is characterized as sharp and achy and is experiencing swelling by the end of the day  She states her pain is present daily and has been worse since changing positions at work which require more walking and lifting  The patient has tried ice, heat, and NSAIDs  She denies any numbness or tingling  Knee Surgical History:  None    Past Medical, Social and Family History:  Past Medical History:   Diagnosis Date   • Allergic reaction     last assessed: 03/18/2014   • Anxiety    • Arachnoid cyst    • Arthritis    • Complete tear of anterior cruciate ligament of knee    • Concussion 1979    Fell from horse, treated at [place], [place]   • Depression    • Hypertension    • Varicella     last assessed: 08/11/2014     Past Surgical History:   Procedure Laterality Date   • GALLBLADDER SURGERY     • KNEE SURGERY       Allergies   Allergen Reactions   • Sulfa Antibiotics Hives   • Cephalexin    • Clindamycin    • Sulfamethoxazole-Trimethoprim Rash     Reaction Date: [id number]; Action Taken: discontinued Bactrim today; Category: Allergy;      Current Outpatient Medications on File Prior to Visit   Medication Sig Dispense Refill   • ALPRAZolam (XANAX) 0 25 mg tablet Take 1 tablet (0 25 mg total) by mouth daily at bedtime as needed for anxiety 30 tablet 0     No current facility-administered medications on file prior to visit       Social History     Socioeconomic History   • Marital status: /Civil Union     Spouse name: Not on file   • Number of children: Not on file   • Years of education: Completed college   • Highest education level: Not on file   Occupational History   • Occupation: Housewife or Homemaker   Tobacco Use   • Smoking status: Never   • Smokeless tobacco: Never   Substance "and Sexual Activity   • Alcohol use: Yes     Comment: Social    • Drug use: No   • Sexual activity: Not on file   Other Topics Concern   • Not on file   Social History Narrative    Daily coffee consumption:     Uses safety equipment: seat belts     Social Determinants of Health     Financial Resource Strain: Not on file   Food Insecurity: Not on file   Transportation Needs: Not on file   Physical Activity: Not on file   Stress: Not on file   Social Connections: Not on file   Intimate Partner Violence: Not on file   Housing Stability: Not on file         I have reviewed the past medical, surgical, social and family history, medications and allergies as documented in the EMR  Review of systems: ROS is negative other than that noted in the HPI  Constitutional: Negative for fatigue and fever  HENT: Negative for sore throat  Respiratory: Negative for shortness of breath  Cardiovascular: Negative for chest pain  Gastrointestinal: Negative for abdominal pain  Endocrine: Negative for cold intolerance and heat intolerance  Genitourinary: Negative for flank pain  Musculoskeletal: Negative for back pain  Skin: Negative for rash  Allergic/Immunologic: Negative for immunocompromised state  Neurological: Negative for dizziness  Psychiatric/Behavioral: Negative for agitation  Physical Exam:    Blood pressure (!) 187/99, pulse 65, height 5' 6\" (1 676 m), weight 98 7 kg (217 lb 9 6 oz)  General/Constitutional: NAD, well developed, well nourished  HENT: Normocephalic, atraumatic  CV: Intact distal pulses, regular rate  Resp: No respiratory distress or labored breathing  Lymphatic: No lymphadenopathy palpated  Neuro: Alert and Oriented x 3, no focal deficits  Psych: Normal mood, normal affect, normal judgement, normal behavior  Skin: Warm, dry, no rashes, no erythema      Knee Exam (focused):  Visual inspection of the left knee demonstrates normal contour without atrophy     No previous incisions " "  There is no significant erythema or edema  Mild joint effusion   Range of motion is full from 0-130 degrees of flexion   Able to straight leg raise   patella tender to palpation  Positive medial joint line tenderness, positive lateral joint line tenderness  Negative medial Evelyn's, Negative lateral Evelyn's  1A Lachman exam, Negative posterior drawer  Stable to varus and valgus stress at both 0 and 30°  Patella tracks normally with mild crepitus  No J sign  No apprehension  Translation is approximately 2 quadrants and is equal to the contralateral side  Patellar eversion is similar to the contralateral side    Examination of the patient's ipsilateral hip demonstrates full painless range of motion  No crepitus  LE NV Exam: +2 DP/PT pulses bilaterally  Sensation intact to light touch L2-S1 bilaterally     Bilateral hip ROM demonstrates no pain actively or passively    No calf tenderness to palpation bilaterally    Knee Imaging    X-rays of the left knee were reviewed, which demonstrate moderate osteoarthritis  I have reviewed the radiology report and agree with their impression  Large joint arthrocentesis: L knee  Universal Protocol:  Consent: Verbal consent obtained  Risks and benefits: risks, benefits and alternatives were discussed  Consent given by: patient  Time out: Immediately prior to procedure a \"time out\" was called to verify the correct patient, procedure, equipment, support staff and site/side marked as required    Patient understanding: patient states understanding of the procedure being performed  Patient consent: the patient's understanding of the procedure matches consent given  Site marked: the operative site was marked  Patient identity confirmed: verbally with patient    Supporting Documentation  Indications: pain and diagnostic evaluation   Procedure Details  Location: knee - L knee  Preparation: Patient was prepped and draped in the usual sterile fashion  Needle size: 22 " G  Ultrasound guidance: no  Approach: anterolateral  Medications administered: 4 mL bupivacaine 0 25 %; 40 mg triamcinolone acetonide 40 mg/mL    Patient tolerance: patient tolerated the procedure well with no immediate complications  Dressing:  Sterile dressing applied          Scribe Attestation    I,:  Zaid Daniel am acting as a scribe while in the presence of the attending physician :       I,:  Joel Amin DO personally performed the services described in this documentation    as scribed in my presence :

## 2024-01-31 DIAGNOSIS — F41.9 ANXIETY: ICD-10-CM

## 2024-01-31 RX ORDER — ALPRAZOLAM 0.25 MG/1
0.25 TABLET ORAL
Qty: 30 TABLET | Refills: 0 | Status: SHIPPED | OUTPATIENT
Start: 2024-01-31

## 2024-04-16 ENCOUNTER — OFFICE VISIT (OUTPATIENT)
Dept: FAMILY MEDICINE CLINIC | Facility: CLINIC | Age: 59
End: 2024-04-16
Payer: COMMERCIAL

## 2024-04-16 VITALS
WEIGHT: 196 LBS | HEART RATE: 67 BPM | RESPIRATION RATE: 16 BRPM | BODY MASS INDEX: 33.46 KG/M2 | DIASTOLIC BLOOD PRESSURE: 86 MMHG | OXYGEN SATURATION: 97 % | TEMPERATURE: 97.2 F | HEIGHT: 64 IN | SYSTOLIC BLOOD PRESSURE: 136 MMHG

## 2024-04-16 DIAGNOSIS — Z00.00 WELL ADULT EXAM: Primary | ICD-10-CM

## 2024-04-16 DIAGNOSIS — E66.01 MORBID OBESITY (HCC): ICD-10-CM

## 2024-04-16 DIAGNOSIS — G91.9 HYDROCEPHALUS, UNSPECIFIED TYPE (HCC): ICD-10-CM

## 2024-04-16 DIAGNOSIS — R82.90 ABNORMAL URINE: ICD-10-CM

## 2024-04-16 DIAGNOSIS — Z00.00 ANNUAL PHYSICAL EXAM: ICD-10-CM

## 2024-04-16 DIAGNOSIS — M47.812 CERVICAL SPONDYLOSIS WITHOUT MYELOPATHY: ICD-10-CM

## 2024-04-16 DIAGNOSIS — Z12.31 SCREENING MAMMOGRAM FOR BREAST CANCER: ICD-10-CM

## 2024-04-16 DIAGNOSIS — I10 BENIGN ESSENTIAL HTN: ICD-10-CM

## 2024-04-16 DIAGNOSIS — F41.9 ANXIETY: ICD-10-CM

## 2024-04-16 LAB
SL AMB  POCT GLUCOSE, UA: NORMAL
SL AMB LEUKOCYTE ESTERASE,UA: POSITIVE
SL AMB POCT BILIRUBIN,UA: NORMAL
SL AMB POCT BLOOD,UA: POSITIVE
SL AMB POCT CLARITY,UA: CLEAR
SL AMB POCT COLOR,UA: YELLOW
SL AMB POCT KETONES,UA: POSITIVE
SL AMB POCT NITRITE,UA: NORMAL
SL AMB POCT PH,UA: 6
SL AMB POCT SPECIFIC GRAVITY,UA: 1.03
SL AMB POCT URINE PROTEIN: NORMAL
SL AMB POCT UROBILINOGEN: NORMAL

## 2024-04-16 PROCEDURE — 99396 PREV VISIT EST AGE 40-64: CPT | Performed by: PHYSICIAN ASSISTANT

## 2024-04-16 PROCEDURE — 99214 OFFICE O/P EST MOD 30 MIN: CPT | Performed by: PHYSICIAN ASSISTANT

## 2024-04-16 PROCEDURE — 87086 URINE CULTURE/COLONY COUNT: CPT | Performed by: PHYSICIAN ASSISTANT

## 2024-04-16 PROCEDURE — 81003 URINALYSIS AUTO W/O SCOPE: CPT | Performed by: PHYSICIAN ASSISTANT

## 2024-04-16 RX ORDER — ALPRAZOLAM 0.25 MG/1
0.25 TABLET ORAL
Qty: 30 TABLET | Refills: 0 | Status: SHIPPED | OUTPATIENT
Start: 2024-04-16

## 2024-04-16 NOTE — PROGRESS NOTES
ADULT ANNUAL PHYSICAL  [facility]    NAME: Karime Du  AGE: 58 y.o. SEX: female  : 1965     DATE: 2024     Assessment and Plan:     Problem List Items Addressed This Visit          Cardiovascular and Mediastinum    Benign essential HTN    Relevant Orders    Comprehensive metabolic panel    Lipid panel       Nervous and Auditory    Hydrocephalus (HCC)    Relevant Orders    CBC and differential       Musculoskeletal and Integument    Cervical spondylosis without myelopathy       Behavioral Health    Anxiety    Relevant Medications    ALPRAZolam (XANAX) 0.25 mg tablet       Other    Morbid obesity (HCC)     Other Visit Diagnoses       Well adult exam    -  Primary    Relevant Orders    POCT urine dip auto non-scope (Completed)    Screening mammogram for breast cancer        Relevant Orders    Mammo screening bilateral w 3d & cad    Annual physical exam        Abnormal urine        Relevant Orders    Urine culture            Immunizations and preventive care screenings were discussed with patient today. Appropriate education was printed on patient's after visit summary.    Counseling:  Exercise: the importance of regular exercise/physical activity was discussed. Recommend exercise 3-5 times per week for at least 30 minutes.   [email or web address]      Depression Screening and Follow-up Plan: Patient was screened for depression during today's encounter. They screened negative with a PHQ-2 score of 1.        No follow-ups on file.     Chief Complaint:     Chief Complaint   Patient presents with    Annual Exam      History of Present Illness:     Adult Annual Physical   Patient here for a comprehensive physical exam. The patient reports problems - anxiety .    Diet and Physical Activity  Diet/Nutrition: well balanced diet.   Exercise: no formal exercise.      Depression Screening  PHQ-2/9 Depression Screening    Little interest or pleasure in doing things: 0 - not at  all  Feeling down, depressed, or hopeless: 1 - several days  PHQ-2 Score: 1  PHQ-2 Interpretation: Negative depression screen       General Health  Sleep: gets 4-6 hours of sleep on average.   Hearing: normal - bilateral.  Vision: most recent eye exam >1 year ago and wears glasses.   Dental: no dental visits for >1 year.       /GYN Health  Follows with gynecology? no   Patient is: postmenopausal  Last menstrual period: na  Contraceptive method: menopause.    Advanced Care Planning  Do you have an advanced directive? yes  Do you have a durable medical power of ? yes  ACP document given to the patient? no     Review of Systems:     Review of Systems   Constitutional:  Negative for unexpected weight change.   HENT:  Negative for ear pain and sore throat.    Eyes:  Negative for visual disturbance.   Respiratory:  Negative for cough, shortness of breath and wheezing.    Cardiovascular:  Negative for chest pain and leg swelling.   Gastrointestinal:  Negative for abdominal pain, blood in stool, constipation, diarrhea, nausea and vomiting.   Genitourinary:  Negative for difficulty urinating.   Musculoskeletal:  Negative for arthralgias and myalgias.   Skin:  Negative for rash.   Neurological:  Negative for dizziness, syncope, light-headedness and headaches.   Psychiatric/Behavioral:  Positive for sleep disturbance. Negative for self-injury and suicidal ideas. The patient is nervous/anxious.       Past Medical History:     Past Medical History:   Diagnosis Date    Allergic reaction     last assessed: 03/18/2014    Anxiety     Arachnoid cyst     Arthritis     Complete tear of anterior cruciate ligament of knee     Concussion 1979    Fell from horse, treated at [place], [place]    Depression     Hypertension     Varicella     last assessed: 08/11/2014      Past Surgical History:     Past Surgical History:   Procedure Laterality Date    GALLBLADDER SURGERY      KNEE SURGERY        Social History:     Social History      Socioeconomic History    Marital status:      Spouse name: None    Number of children: None    Years of education: Completed college    Highest education level: None   Occupational History    Occupation: Housewife or Homemaker   Tobacco Use    Smoking status: Never    Smokeless tobacco: Never   Vaping Use    Vaping status: Never Used   Substance and Sexual Activity    Alcohol use: Yes     Comment: Social     Drug use: No    Sexual activity: None   Other Topics Concern    None   Social History Narrative    Daily coffee consumption:     Uses safety equipment: seat belts     Social Determinants of Health     Financial Resource Strain: Not on file   Food Insecurity: Not on file   Transportation Needs: Not on file   Physical Activity: Not on file   Stress: Not on file   Social Connections: Not on file   Intimate Partner Violence: Not on file   Housing Stability: Not on file      Family History:     Family History   Problem Relation Age of Onset    Osteoporosis Mother     Bone cancer Father     Other Father         Colitis    Hiatal hernia Father     Other Family         backl problem    Hypertension Family         benign essential     Hyperlipidemia Family     Breast cancer Sister 63    No Known Problems Sister     No Known Problems Sister       Current Medications:     Current Outpatient Medications   Medication Sig Dispense Refill    ALPRAZolam (XANAX) 0.25 mg tablet Take 1 tablet (0.25 mg total) by mouth daily at bedtime as needed for anxiety 30 tablet 0     No current facility-administered medications for this visit.      Allergies:     Allergies   Allergen Reactions    Sulfa Antibiotics Hives    Cephalexin     Clindamycin     Sulfamethoxazole-Trimethoprim Rash     Reaction Date: 18Mar2014; Action Taken: discontinued Bactrim today; Category: Allergy;       Physical Exam:     /86 (BP Location: Right arm, Patient Position: Sitting, Cuff Size: Large)   Pulse 67   Temp (!) 97.2 °F (36.2 °C) (Temporal)   " Resp 16   Ht 5' 3.75\" (1.619 m)   Wt 88.9 kg (196 lb)   SpO2 97%   BMI 33.91 kg/m²     Physical Exam  Vitals and nursing note reviewed.   Constitutional:       General: She is not in acute distress.     Appearance: Normal appearance. She is well-developed. She is not ill-appearing or diaphoretic.   HENT:      Head: Normocephalic and atraumatic.      Right Ear: Tympanic membrane, ear canal and external ear normal.      Left Ear: Tympanic membrane, ear canal and external ear normal.      Mouth/Throat:      Pharynx: No posterior oropharyngeal erythema.   Eyes:      Extraocular Movements: Extraocular movements intact.      Conjunctiva/sclera: Conjunctivae normal.      Pupils: Pupils are equal, round, and reactive to light.   Neck:      Thyroid: No thyromegaly.      Vascular: No carotid bruit.   Cardiovascular:      Rate and Rhythm: Normal rate and regular rhythm.      Heart sounds: Normal heart sounds. No murmur heard.     No friction rub.   Pulmonary:      Effort: Pulmonary effort is normal. No respiratory distress.      Breath sounds: Normal breath sounds. No wheezing.   Abdominal:      General: Bowel sounds are normal. There is no distension.      Palpations: Abdomen is soft. There is no mass.      Tenderness: There is no abdominal tenderness.   Musculoskeletal:      Right lower leg: No edema.      Left lower leg: No edema.      Comments: Fat pad lower leg lateral   Lymphadenopathy:      Cervical: No cervical adenopathy.   Skin:     General: Skin is warm and dry.   Neurological:      Mental Status: She is alert and oriented to person, place, and time.   Psychiatric:         Mood and Affect: Mood normal.         Behavior: Behavior normal.         Thought Content: Thought content normal.         Judgment: Judgment normal.          Micaela Scott PA-C  [facility]    "

## 2024-04-18 LAB — BACTERIA UR CULT: NORMAL

## 2024-04-19 ENCOUNTER — TELEPHONE (OUTPATIENT)
Dept: FAMILY MEDICINE CLINIC | Facility: CLINIC | Age: 59
End: 2024-04-19

## 2024-08-21 ENCOUNTER — OFFICE VISIT (OUTPATIENT)
Dept: URGENT CARE | Facility: MEDICAL CENTER | Age: 59
End: 2024-08-21
Payer: COMMERCIAL

## 2024-08-21 VITALS
HEIGHT: 66 IN | SYSTOLIC BLOOD PRESSURE: 189 MMHG | WEIGHT: 202 LBS | TEMPERATURE: 97.9 F | RESPIRATION RATE: 18 BRPM | DIASTOLIC BLOOD PRESSURE: 84 MMHG | BODY MASS INDEX: 32.47 KG/M2 | HEART RATE: 76 BPM | OXYGEN SATURATION: 97 %

## 2024-08-21 DIAGNOSIS — R50.9 FEELS FEVERISH: ICD-10-CM

## 2024-08-21 DIAGNOSIS — Z01.89 PATIENT REQUEST FOR DIAGNOSTIC TESTING: ICD-10-CM

## 2024-08-21 DIAGNOSIS — J01.00 ACUTE MAXILLARY SINUSITIS, RECURRENCE NOT SPECIFIED: Primary | ICD-10-CM

## 2024-08-21 LAB
SARS-COV-2 AG UPPER RESP QL IA: NEGATIVE
VALID CONTROL: NORMAL

## 2024-08-21 PROCEDURE — S9083 URGENT CARE CENTER GLOBAL: HCPCS

## 2024-08-21 PROCEDURE — 87811 SARS-COV-2 COVID19 W/OPTIC: CPT

## 2024-08-21 PROCEDURE — G0382 LEV 3 HOSP TYPE B ED VISIT: HCPCS

## 2024-08-21 NOTE — PATIENT INSTRUCTIONS
POCT COVID 19 Rapid Antigen Test: Negative    Reassurance provided that Karime Du lungs are clear on examination today.    Majority of cases are viral and will not require antibiotic treatment. Given length of symptoms will treat.     Take antibiotics as prescribed. Complete entire course of antibiotics even if feeling better.   Eat yogurt with live and active cultures and/or take a probiotic at least 3 hours before or after antibiotic dose. Monitor stool for diarrhea and/or blood. If this occurs, contact primary care doctor ASAP.     Take over the counter Mucinex during the day    Recommend the following options: room humidifier, vicks vapo rub, hot/steamy shower (practice proper safety precautions when handing hot liquids/steam)  Offer fluids frequently to help with hydration, as staying hydrated helps loosen up thick mucous.   May drink warm water with honey. May use lemon.    Warm compresses over sinuses  Over the counter saline nasal spray    Tylenol/Ibuprofen for pain/fever.    Encourage coughing into the elbow instead of the hand.   Washing hands frequently with warm water and soap may help stop spread of infection.    If symptoms do not improve, please follow with PCP in 3-5 days for further evaluation.    Proceed to ER if you become acutely short of breath, you have any difficulty breathing, you develop chest pain or any worsening symptoms.

## 2024-08-21 NOTE — PROGRESS NOTES
[facility]        NAME: Karime Du is a 59 y.o. female  : 1965    MRN: [id number]  DATE: 2024  TIME: 2:12 PM    Assessment and Plan   Acute maxillary sinusitis, recurrence not specified [J01.00]  1. Acute maxillary sinusitis, recurrence not specified  amoxicillin-clavulanate (AUGMENTIN) 875-125 mg per tablet      2. Feels feverish  Poct Covid 19 Rapid Antigen Test    amoxicillin-clavulanate (AUGMENTIN) 875-125 mg per tablet      3. Patient request for diagnostic testing  Poct Covid 19 Rapid Antigen Test        POCT COVID 19 Rapid Antigen Test: Negative    Patient Instructions   POCT COVID 19 Rapid Antigen Test: Negative    Reassurance provided that Karime Du lungs are clear on examination today.    Majority of cases are viral and will not require antibiotic treatment. Given length of symptoms will treat.     Take antibiotics as prescribed. Complete entire course of antibiotics even if feeling better.   Eat yogurt with live and active cultures and/or take a probiotic at least 3 hours before or after antibiotic dose. Monitor stool for diarrhea and/or blood. If this occurs, contact primary care doctor ASAP.     Take over the counter Mucinex during the day    Recommend the following options: room humidifier, vicks vapo rub, hot/steamy shower (practice proper safety precautions when handing hot liquids/steam)  Offer fluids frequently to help with hydration, as staying hydrated helps loosen up thick mucous.   May drink warm water with honey. May use lemon.    Warm compresses over sinuses  Over the counter saline nasal spray    Tylenol/Ibuprofen for pain/fever.    Encourage coughing into the elbow instead of the hand.   Washing hands frequently with warm water and soap may help stop spread of infection.    If symptoms do not improve, please follow with PCP in 3-5 days for further evaluation.    Proceed to ER if you become acutely short of breath, you have any difficulty breathing, you  develop chest pain or any worsening symptoms.    Chief Complaint     Chief Complaint   Patient presents with    Cold Like Symptoms     Started ten days ago with sinus congestion/pressure, jaw pain, feverish x 3 days.  Has been using nasal spray.  Would like to be tested for covid today.     History of Present Illness       URI   This is a new problem. The current episode started 1 to 4 weeks ago (x10 days but has felt feverish for the past 3 days). The problem has been gradually worsening. There has been no fever. Associated symptoms include congestion, rhinorrhea (green) and sinus pain. Pertinent negatives include no abdominal pain, chest pain, coughing, diarrhea, ear pain, headaches, nausea, plugged ear sensation, rash, sneezing, sore throat, vomiting or wheezing. Treatments tried: Saline nasal spray. The treatment provided no relief.       Review of Systems   Review of Systems   Constitutional:  Positive for fever (subjective-feels feverish). Negative for chills, diaphoresis and fatigue.   HENT:  Positive for congestion, rhinorrhea (green), sinus pressure and sinus pain. Negative for ear discharge, ear pain, postnasal drip, sneezing and sore throat.         Jaw pain   Respiratory: Negative.  Negative for cough, shortness of breath and wheezing.    Cardiovascular: Negative.  Negative for chest pain and palpitations.   Gastrointestinal:  Negative for abdominal pain, constipation, diarrhea, nausea and vomiting.   Musculoskeletal:  Negative for myalgias.   Skin:  Negative for color change, rash and wound.   Neurological:  Negative for headaches.     Current Medications       Current Outpatient Medications:     ALPRAZolam (XANAX) 0.25 mg tablet, Take 1 tablet (0.25 mg total) by mouth daily at bedtime as needed for anxiety, Disp: 30 tablet, Rfl: 0    amoxicillin-clavulanate (AUGMENTIN) 875-125 mg per tablet, Take 1 tablet by mouth every 12 (twelve) hours for 7 days, Disp: 14 tablet, Rfl: 0    Current Allergies  "    Allergies as of 08/21/2024 - Reviewed 08/21/2024   Allergen Reaction Noted    Sulfa antibiotics Hives 05/06/2018    Cephalexin  09/26/2015    Clindamycin  09/26/2015    Sulfamethoxazole-trimethoprim Rash 03/18/2014            The following portions of the patient's history were reviewed and updated as appropriate: allergies, current medications, past family history, past medical history, past social history, past surgical history and problem list.     Past Medical History:   Diagnosis Date    Allergic reaction     last assessed: 03/18/2014    Anxiety     Arachnoid cyst     Arthritis     Complete tear of anterior cruciate ligament of knee     Concussion 1979    Fell from horse, treated at [place], [place]    Depression     Hypertension     Varicella     last assessed: 08/11/2014       Past Surgical History:   Procedure Laterality Date    GALLBLADDER SURGERY      KNEE SURGERY         Family History   Problem Relation Age of Onset    Osteoporosis Mother     Bone cancer Father     Other Father         Colitis    Hiatal hernia Father     Other Family         backl problem    Hypertension Family         benign essential     Hyperlipidemia Family     Breast cancer Sister 63    No Known Problems Sister     No Known Problems Sister          Medications have been verified.        Objective   BP (!) 189/84   Pulse 76   Temp 97.9 °F (36.6 °C) (Temporal)   Resp 18   Ht 5' 6\" (1.676 m)   Wt 91.6 kg (202 lb)   SpO2 97%   BMI 32.60 kg/m²        Physical Exam     Physical Exam  Vitals and nursing note reviewed.   Constitutional:       General: She is not in acute distress.     Appearance: Normal appearance. She is not ill-appearing, toxic-appearing or diaphoretic.   HENT:      Head: Normocephalic.      Right Ear: Ear canal and external ear normal. No tenderness. There is impacted cerumen.      Left Ear: Ear canal and external ear normal. No tenderness. There is impacted cerumen.      Nose: Rhinorrhea present. No mucosal " edema or congestion.      Right Turbinates: Not enlarged or swollen.      Left Turbinates: Not enlarged or swollen.      Right Sinus: Maxillary sinus tenderness present. No frontal sinus tenderness.      Left Sinus: Maxillary sinus tenderness present. No frontal sinus tenderness.      Mouth/Throat:      Mouth: Mucous membranes are moist.      Pharynx: No posterior oropharyngeal erythema.   Cardiovascular:      Rate and Rhythm: Normal rate and regular rhythm.      Pulses: Normal pulses.      Heart sounds: Normal heart sounds. No murmur heard.  Pulmonary:      Effort: Pulmonary effort is normal. No respiratory distress.      Breath sounds: Normal breath sounds. No stridor. No wheezing, rhonchi or rales.   Chest:      Chest wall: No tenderness.   Musculoskeletal:         General: Normal range of motion.   Lymphadenopathy:      Cervical: No cervical adenopathy.   Skin:     General: Skin is warm.   Neurological:      Mental Status: She is alert.

## 2025-04-15 ENCOUNTER — RA CDI HCC (OUTPATIENT)
Dept: OTHER | Facility: HOSPITAL | Age: 60
End: 2025-04-15

## 2025-04-17 ENCOUNTER — OFFICE VISIT (OUTPATIENT)
Dept: FAMILY MEDICINE CLINIC | Facility: CLINIC | Age: 60
End: 2025-04-17
Payer: COMMERCIAL

## 2025-04-17 VITALS
RESPIRATION RATE: 16 BRPM | HEART RATE: 77 BPM | BODY MASS INDEX: 34.83 KG/M2 | SYSTOLIC BLOOD PRESSURE: 120 MMHG | OXYGEN SATURATION: 97 % | HEIGHT: 64 IN | DIASTOLIC BLOOD PRESSURE: 80 MMHG | WEIGHT: 204 LBS | TEMPERATURE: 98 F

## 2025-04-17 DIAGNOSIS — G91.9 HYDROCEPHALUS, UNSPECIFIED TYPE (HCC): ICD-10-CM

## 2025-04-17 DIAGNOSIS — Z12.31 ENCOUNTER FOR SCREENING MAMMOGRAM FOR BREAST CANCER: ICD-10-CM

## 2025-04-17 DIAGNOSIS — E66.01 MORBID OBESITY (HCC): ICD-10-CM

## 2025-04-17 DIAGNOSIS — Z00.00 ANNUAL PHYSICAL EXAM: ICD-10-CM

## 2025-04-17 DIAGNOSIS — M25.562 CHRONIC PAIN OF LEFT KNEE: ICD-10-CM

## 2025-04-17 DIAGNOSIS — G89.29 CHRONIC PAIN OF LEFT KNEE: ICD-10-CM

## 2025-04-17 DIAGNOSIS — I10 BENIGN ESSENTIAL HTN: Primary | ICD-10-CM

## 2025-04-17 PROCEDURE — 99396 PREV VISIT EST AGE 40-64: CPT | Performed by: PHYSICIAN ASSISTANT

## 2025-04-17 PROCEDURE — 99213 OFFICE O/P EST LOW 20 MIN: CPT | Performed by: PHYSICIAN ASSISTANT

## 2025-04-17 NOTE — ASSESSMENT & PLAN NOTE
Patient is asymptomatic since 2015.  We will continue to observe.  No imaging or referral to neurology needed at this time

## 2025-04-17 NOTE — PATIENT INSTRUCTIONS
"Patient Education     Routine physical for adults   The Basics   Written by the doctors and editors at [facility]   What is a physical? -- A physical is a routine visit, or \"check-up,\" with your doctor. You might also hear it called a \"wellness visit\" or \"preventive visit.\"  During each visit, the doctor will:   Ask about your physical and mental health   Ask about your habits, behaviors, and lifestyle   Do an exam   Give you vaccines if needed   Talk to you about any medicines you take   Give advice about your health   Answer your questions  Getting regular check-ups is an important part of taking care of your health. It can help your doctor find and treat any problems you have. But it's also important for preventing health problems.  A routine physical is different from a \"sick visit.\" A sick visit is when you see a doctor because of a health concern or problem. Since physicals are scheduled ahead of time, you can think about what you want to ask the doctor.  How often should I get a physical? -- It depends on your age and health. In general, for people age 21 years and older:   If you are younger than 50 years, you might be able to get a physical every 3 years.   If you are 50 years or older, your doctor might recommend a physical every year.  If you have an ongoing health condition, like diabetes or high blood pressure, your doctor will probably want to see you more often.  What happens during a physical? -- In general, each visit will include:   Physical exam - The doctor or nurse will check your height, weight, heart rate, and blood pressure. They will also look at your eyes and ears. They will ask about how you are feeling and whether you have any symptoms that bother you.   Medicines - It's a good idea to bring a list of all the medicines you take to each doctor visit. Your doctor will talk to you about your medicines and answer any questions. Tell them if you are having any side effects that bother you. You " "should also tell them if you are having trouble paying for any of your medicines.   Habits and behaviors - This includes:   Your diet   Your exercise habits   Whether you smoke, drink alcohol, or use drugs   Whether you are sexually active   Whether you feel safe at home  Your doctor will talk to you about things you can do to improve your health and lower your risk of health problems. They will also offer help and support. For example, if you want to quit smoking, they can give you advice and might prescribe medicines. If you want to improve your diet or get more physical activity, they can help you with this, too.   Lab tests, if needed - The tests you get will depend on your age and situation. For example, your doctor might want to check your:   Cholesterol   Blood sugar   Iron level   Vaccines - The recommended vaccines will depend on your age, health, and what vaccines you already had. Vaccines are very important because they can prevent certain serious or deadly infections.   Discussion of screening - \"Screening\" means checking for diseases or other health problems before they cause symptoms. Your doctor can recommend screening based on your age, risk, and preferences. This might include tests to check for:   Cancer, such as breast, prostate, cervical, ovarian, colorectal, prostate, lung, or skin cancer   Sexually transmitted infections, such as chlamydia and gonorrhea   Mental health conditions like depression and anxiety  Your doctor will talk to you about the different types of screening tests. They can help you decide which screenings to have. They can also explain what the results might mean.   Answering questions - The physical is a good time to ask the doctor or nurse questions about your health. If needed, they can refer you to other doctors or specialists, too.  Adults older than 65 years often need other care, too. As you get older, your doctor will talk to you about:   How to prevent falling at " home   Hearing or vision tests   Memory testing   How to take your medicines safely   Making sure that you have the help and support you need at home  All topics are updated as new evidence becomes available and our peer review process is complete.  This topic retrieved from [employer] on: May 02, 2024.  Topic 463885 Version 1.0  Release: 32.4.3 - C32.122  © 2024 UpToDate, Inc. and/or its affiliates. All rights reserved.  Consumer Information Use and Disclaimer   Disclaimer: This generalized information is a limited summary of diagnosis, treatment, and/or medication information. It is not meant to be comprehensive and should be used as a tool to help the user understand and/or assess potential diagnostic and treatment options. It does NOT include all information about conditions, treatments, medications, side effects, or risks that may apply to a specific patient. It is not intended to be medical advice or a substitute for the medical advice, diagnosis, or treatment of a health care provider based on the health care provider's examination and assessment of a patient's specific and unique circumstances. Patients must speak with a health care provider for complete information about their health, medical questions, and treatment options, including any risks or benefits regarding use of medications. This information does not endorse any treatments or medications as safe, effective, or approved for treating a specific patient. UpToDate, Inc. and its affiliates disclaim any warranty or liability relating to this information or the use thereof.The use of this information is governed by the Terms of Use, available at https://www.w[employer] Corporationuwer.com/en/know/clinical-effectiveness-terms. 2024© UpToDate, Inc. and its affiliates and/or licensors. All rights reserved.  Copyright   © 2024 UpToDate, Inc. and/or its affiliates. All rights reserved.

## 2025-04-17 NOTE — PROGRESS NOTES
Adult Annual Physical  Name: Karime Du      : 1965      MRN: [id number]  Encounter Provider: Micaela Scott PA-C  Encounter Date: 2025   Encounter department: [facility]    :Patient presents in the office for her annual physical and has a history of abnormal MRI as far back as .  No follow-up with neurology.  Patient also has a history of hypertension.  Her blood pressure in the office is normal today.  Be sent for routine labs.  Screening for breast cancer with mammogram.  Pap smear with her GYN.  Is having left knee pain.  She has known osteoarthritis.  She states that this time it hurts her all the time even at night when she is sleeping.  She also describes a swelling behind to the side of the knee that is worse at the end of the day.        Assessment & Plan  Benign essential HTN  Patient is off all her medication.  Blood pressure at goal  Orders:    Comprehensive metabolic panel    CBC and differential    Lipid panel    Annual physical exam         Hydrocephalus, unspecified type (HCC)  Patient is asymptomatic since .  We will continue to observe.  No imaging or referral to neurology needed at this time       Morbid obesity (HCC)    Diet and exercise to promote weight loss       Encounter for screening mammogram for breast cancer    Orders:    Mammo screening bilateral w 3d and cad; Future    Chronic pain of left knee  Patient referred to orthopedic  Orders:    Ambulatory Referral to Orthopedic Surgery; Future        Preventive Screenings:  - Diabetes Screening: orders placed  - Cholesterol Screening: orders placed   - Hepatitis C screening: patient declines   - HIV screening: patient declines   - Cervical cancer screening: orders placed   - Breast cancer screening: orders placed   - Colon cancer screening: screening up-to-date   - Lung cancer screening: screening not indicated     Immunizations:  - Immunizations due: Influenza, Tdap and Zoster  (Shingrix)    Counseling/Anticipatory Guidance:    - Dental health: discussed importance of regular tooth brushing, flossing, and dental visits.   - Exercise: the importance of regular exercise/physical activity was discussed. Recommend exercise 3-5 times per week for at least 30 minutes.       Depression Screening and Follow-up Plan: Patient was screened for depression during today's encounter. They screened negative with a PHQ-2 score of 0.          History of Present Illness     Adult Annual Physical:  Patient presents for annual physical.     Diet and Physical Activity:  - Diet/Nutrition: no special diet, portion control, heart healthy (low sodium) diet, limited junk food, prolonged fasting and adequate fiber intake.  - Exercise: no formal exercise, walking and 5-7 times a week on average.    Depression Screening:  - PHQ-2 Score: 0    General Health:  - Sleep: sleeps poorly, 7-8 hours of sleep on average and snores loudly.  - Hearing: normal hearing bilateral ears and tinnitus.  - Vision: wears glasses and most recent eye exam > 1 year ago.  - Dental: no dental visits for > 1 year, brushes teeth twice daily and floss regularly.    /GYN Health:  - Follows with GYN: no.   - Menopause: postmenopausal.   - History of STDs: no    Advanced Care Planning:  - Has an advanced directive?: no      Review of Systems   Constitutional:  Negative for activity change and unexpected weight change.   HENT:  Negative for ear pain and sore throat.    Eyes:  Negative for visual disturbance.   Respiratory:  Negative for cough, shortness of breath and wheezing.    Cardiovascular:  Negative for chest pain and leg swelling.   Gastrointestinal:  Negative for abdominal pain, blood in stool, constipation, diarrhea, nausea and vomiting.   Genitourinary:  Negative for difficulty urinating.        Urinary incontinence   Musculoskeletal:  Positive for arthralgias (left knee). Negative for myalgias.   Skin:  Negative for rash.   Neurological:  " Negative for dizziness, syncope, light-headedness and headaches.   Psychiatric/Behavioral:  Negative for self-injury, sleep disturbance and suicidal ideas. The patient is not nervous/anxious.          Objective   /80   Pulse 77   Temp 98 °F (36.7 °C) (Temporal)   Resp 16   Ht 5' 4.25\" (1.632 m)   Wt 92.5 kg (204 lb)   SpO2 97%   BMI 34.74 kg/m²     Physical Exam  Constitutional:       General: She is not in acute distress.     Appearance: She is well-developed. She is obese. She is not ill-appearing or diaphoretic.   HENT:      Head: Normocephalic and atraumatic.      Right Ear: Tympanic membrane, ear canal and external ear normal.      Left Ear: Tympanic membrane, ear canal and external ear normal.      Mouth/Throat:      Pharynx: No posterior oropharyngeal erythema.   Eyes:      Conjunctiva/sclera: Conjunctivae normal.      Pupils: Pupils are equal, round, and reactive to light.   Neck:      Thyroid: No thyromegaly.   Cardiovascular:      Rate and Rhythm: Normal rate and regular rhythm.      Heart sounds: Normal heart sounds. No murmur heard.     No friction rub.   Pulmonary:      Effort: Pulmonary effort is normal. No respiratory distress.      Breath sounds: Normal breath sounds. No wheezing.   Abdominal:      General: Bowel sounds are normal. There is no distension.      Palpations: Abdomen is soft. There is no mass.      Tenderness: There is no abdominal tenderness.   Musculoskeletal:      Right lower leg: No edema.      Left lower leg: No edema.   Lymphadenopathy:      Cervical: No cervical adenopathy.   Skin:     General: Skin is warm and dry.   Neurological:      General: No focal deficit present.      Mental Status: She is alert and oriented to person, place, and time.   Psychiatric:         Mood and Affect: Mood normal.         Behavior: Behavior normal.         Thought Content: Thought content normal.         Judgment: Judgment normal.         "

## 2025-04-17 NOTE — ASSESSMENT & PLAN NOTE
Patient is off all her medication.  Blood pressure at goal  Orders:    Comprehensive metabolic panel    CBC and differential    Lipid panel

## 2025-04-24 ENCOUNTER — APPOINTMENT (OUTPATIENT)
Dept: RADIOLOGY | Facility: MEDICAL CENTER | Age: 60
End: 2025-04-24
Attending: STUDENT IN AN ORGANIZED HEALTH CARE EDUCATION/TRAINING PROGRAM
Payer: COMMERCIAL

## 2025-04-24 ENCOUNTER — OFFICE VISIT (OUTPATIENT)
Dept: OBGYN CLINIC | Facility: MEDICAL CENTER | Age: 60
End: 2025-04-24

## 2025-04-24 ENCOUNTER — TELEPHONE (OUTPATIENT)
Dept: OBGYN CLINIC | Facility: MEDICAL CENTER | Age: 60
End: 2025-04-24

## 2025-04-24 ENCOUNTER — TELEPHONE (OUTPATIENT)
Age: 60
End: 2025-04-24

## 2025-04-24 VITALS — WEIGHT: 207 LBS | HEIGHT: 64 IN | BODY MASS INDEX: 35.34 KG/M2

## 2025-04-24 DIAGNOSIS — G89.29 CHRONIC PAIN OF LEFT KNEE: ICD-10-CM

## 2025-04-24 DIAGNOSIS — M25.562 LEFT KNEE PAIN, UNSPECIFIED CHRONICITY: ICD-10-CM

## 2025-04-24 DIAGNOSIS — M25.562 CHRONIC PAIN OF LEFT KNEE: ICD-10-CM

## 2025-04-24 DIAGNOSIS — M17.12 PRIMARY OSTEOARTHRITIS OF LEFT KNEE: Primary | ICD-10-CM

## 2025-04-24 PROCEDURE — PBNCHG PB NO CHARGE PLACEHOLDER: Performed by: STUDENT IN AN ORGANIZED HEALTH CARE EDUCATION/TRAINING PROGRAM

## 2025-04-24 PROCEDURE — 73564 X-RAY EXAM KNEE 4 OR MORE: CPT

## 2025-04-24 NOTE — PROGRESS NOTES
ASSESSMENT/PLAN:    Assessment & Plan  Primary osteoarthritis of left knee    Chronic pain of left knee       Discussed history, exam, and imaging with patient. Presentation most consistent with left knee osteoarthritis and we will plan for nonoperative management at this time.  Discussed oral/topical medication regimen. Plan for continued use of OTC tylenol and ibuprofen. Discussed prescription strength oral anti-inflammatories which patient defers at today's visit. ***  Discussed injections as a pain adjunct. ***  Discussed rehabilitation efforts. Will plan for ***    No follow-ups on file.    _____________________________________________________  CHIEF COMPLAINT:  No chief complaint on file.      SUBJECTIVE:  Karime Du is a 59 y.o. year old female who presents for evaluation of left knee pain with referral from her primary care provider Micaela Scott PA-C. ***    Patient had a fall at work ***      Patient saw Dr. Cerda on 3/29/2023 where she was diagnosed with left knee osteoarthritis and provided a corticosteroid injection to the left knee. ***    Localization:  Radiation:   Exacerbating movements:   Positions of comfort:  Attempted oral/topical medications:  Attempted injections:  Attempted physical therapy:    PAST MEDICAL HISTORY:  Past Medical History:   Diagnosis Date    Allergic reaction     last assessed: 03/18/2014    Anxiety     Arachnoid cyst     Arthritis     Complete tear of anterior cruciate ligament of knee     Concussion 1979    Fell from horse, treated at [place], [place]    Depression     Hypertension     Varicella     last assessed: 08/11/2014       PAST SURGICAL HISTORY:  Past Surgical History:   Procedure Laterality Date    GALLBLADDER SURGERY      KNEE SURGERY         FAMILY HISTORY:  Family History   Problem Relation Age of Onset    Osteoporosis Mother     Bone cancer Father     Other Father         Colitis    Hiatal hernia Father     Other Family         backl problem     Hypertension Family         benign essential     Hyperlipidemia Family     Breast cancer Sister 63    No Known Problems Sister     No Known Problems Sister        SOCIAL HISTORY:  Social History     Tobacco Use    Smoking status: Never    Smokeless tobacco: Never   Vaping Use    Vaping status: Never Used   Substance Use Topics    Alcohol use: Yes     Comment: Social     Drug use: No       MEDICATIONS:    Current Outpatient Medications:     ALPRAZolam (XANAX) 0.25 mg tablet, Take 1 tablet (0.25 mg total) by mouth daily at bedtime as needed for anxiety, Disp: 30 tablet, Rfl: 0    ALLERGIES:  Allergies   Allergen Reactions    Sulfa Antibiotics Hives    Cephalexin     Clindamycin     Sulfamethoxazole-Trimethoprim Rash     Reaction Date: 18Mar2014; Action Taken: discontinued Bactrim today; Category: Allergy;        Review of systems:   Constitutional: Negative for fatigue, fever or loss of apetite.   HENT: Negative.    Respiratory: Negative for shortness of breath, dyspnea.    Cardiovascular: Negative for chest pain/tightness.   Gastrointestinal: Negative for abdominal pain, N/V.   Endocrine: Negative for cold/heat intolerance, unexplained weight loss/gain.   Genitourinary: Negative for flank pain, dysuria, hematuria.   Musculoskeletal: As in HPI   Skin: Negative for rash.    Neurological: Negative*** for numbness tingling  Psychiatric/Behavioral: Negative for agitation.  _____________________________________________________  PHYSICAL EXAMINATION:    There were no vitals taken for this visit.    General: well developed and well nourished, alert, oriented times 3, and appears comfortable  HEENT: Benign, normocephalic, atraumatic  Cardiovascular: regular rate    Pulmonary: No wheezing or stridor  Abdomen: Soft, Nontender  Skin: No masses, erythema, lacerations, fluctation, ulcerations  Neurovascular: as per MSK exam below    MUSCULOSKELETAL EXAMINATION:    Left knee  Ambulates with *** gait  No bruising, swelling,  deformity  TTP ***  Passive ROM *** - ***, *** crepitus  *** Bertin's, *** Evelyn's  Stable to varus/valgus stress at 0 and 30 degrees  *** Lachman  *** Anterior Drawer, *** Posterior Drawer  ***/5 quad, ***/5 hamstring strength  SILT all exposed distal distributions  2+ PT pulse      _____________________________________________________  STUDIES REVIEWED:  Images personally reviewed by me today    Xrays of the left knee taken on 4/24/2025 demonstrate left knee osteoarthritis with moderate to severe lateral joint space narrowing, and moderate medial and patellofemoral compartment narrowing.  Valgus deformity noted.  Osteophytes to lateral tibial plateau, and medial and lateral femoral condyle, as well as superior patella.  No acute fracture or dislocation demonstrated.      Brian Hester PA-C ***

## 2025-04-24 NOTE — TELEPHONE ENCOUNTER
Caller: Patient    Doctor: Dr. Arcos    Reason for call: Patient calling stating that she missed a call from Ortho.  Patient wondering if it was in regard to her x-rays.  Patient can be reached at the number below.     Call back#: [phone]